# Patient Record
Sex: MALE | Race: BLACK OR AFRICAN AMERICAN | Employment: UNEMPLOYED | URBAN - METROPOLITAN AREA
[De-identification: names, ages, dates, MRNs, and addresses within clinical notes are randomized per-mention and may not be internally consistent; named-entity substitution may affect disease eponyms.]

---

## 2018-07-09 ENCOUNTER — TELEPHONE (OUTPATIENT)
Dept: SURGERY | Age: 58
End: 2018-07-09

## 2018-07-09 NOTE — TELEPHONE ENCOUNTER
I called [phone] at 1:00pm on 07/09/2018 and spoke with Mr. Diallo Scott about scheduling him an appointment with Dr. Kenyatta Morales for his inguinal hernia repair. Mr. Diallo Scott informed me that, Bety Pedraza would have to call me right back due to his sister is his transportation to and from all his appointments. \" I gave Mr. Strickland my direct number to call me back, so that we can set up his appointment as soon as possible. . Maria Luisa Gann received a call from [phone] at 1:12pm from Mrs. Israel Kolb, to schedule an appointment with Dr. Kenyatta Morales on July 25, 2018 at 08:30am. Mrs. Wallace seemed to be unsure if that date and time would work. I explained to Mrs. Wallace that if that date and time didn't work to please call me and we would be able to reschedule Mr. Leary appointment. ..  Perla Mcmahon

## 2020-01-03 ENCOUNTER — HOSPITAL ENCOUNTER (EMERGENCY)
Age: 60
Discharge: HOME | End: 2020-01-03
Attending: EMERGENCY MEDICINE
Payer: MEDICAID

## 2020-01-03 ENCOUNTER — APPOINTMENT (OUTPATIENT)
Dept: GENERAL RADIOLOGY | Age: 60
End: 2020-01-03
Attending: PHYSICIAN ASSISTANT
Payer: MEDICAID

## 2020-01-03 VITALS
HEIGHT: 69 IN | TEMPERATURE: 98.1 F | WEIGHT: 140 LBS | BODY MASS INDEX: 20.73 KG/M2 | RESPIRATION RATE: 18 BRPM | DIASTOLIC BLOOD PRESSURE: 78 MMHG | SYSTOLIC BLOOD PRESSURE: 139 MMHG | HEART RATE: 70 BPM | OXYGEN SATURATION: 98 %

## 2020-01-03 DIAGNOSIS — M25.512 ACUTE PAIN OF LEFT SHOULDER: Primary | ICD-10-CM
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
## 2020-01-03 PROCEDURE — 99283 EMERGENCY DEPT VISIT LOW MDM: CPT

## 2020-01-03 PROCEDURE — 74011250637 HC RX REV CODE- 250/637: Performed by: PHYSICIAN ASSISTANT

## 2020-01-03 PROCEDURE — 73030 X-RAY EXAM OF SHOULDER: CPT

## 2020-01-03 RX ORDER — IBUPROFEN 600 MG/1
600 TABLET ORAL
Status: COMPLETED | OUTPATIENT
Start: 2020-01-03 | End: 2020-01-03

## 2020-01-03 RX ORDER — CYCLOBENZAPRINE HCL 10 MG
10 TABLET ORAL
Qty: 12 TAB | Refills: 0 | Status: SHIPPED | OUTPATIENT
Start: 2020-01-03 | End: 2022-01-19

## 2020-01-03 RX ORDER — IBUPROFEN 600 MG/1
600 TABLET ORAL
Qty: 20 TAB | Refills: 0 | Status: SHIPPED | OUTPATIENT
Start: 2020-01-03 | End: 2020-01-03

## 2020-01-03 RX ORDER — CYCLOBENZAPRINE HCL 10 MG
10 TABLET ORAL
Status: COMPLETED | OUTPATIENT
Start: 2020-01-03 | End: 2020-01-03

## 2020-01-03 RX ADMIN — CYCLOBENZAPRINE HYDROCHLORIDE 10 MG: 10 TABLET, FILM COATED ORAL at 14:28

## 2020-01-03 RX ADMIN — IBUPROFEN 600 MG: 600 TABLET ORAL at 14:29
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
## 2021-07-17 ENCOUNTER — HOSPITAL ENCOUNTER (EMERGENCY)
Age: 61
Discharge: HOME | End: 2021-07-17
Attending: EMERGENCY MEDICINE
Payer: MEDICAID

## 2021-07-17 VITALS
OXYGEN SATURATION: 97 % | RESPIRATION RATE: 18 BRPM | HEART RATE: 92 BPM | TEMPERATURE: 98.8 F | SYSTOLIC BLOOD PRESSURE: 107 MMHG | DIASTOLIC BLOOD PRESSURE: 73 MMHG | HEIGHT: 67 IN | BODY MASS INDEX: 21.97 KG/M2 | WEIGHT: 140 LBS

## 2021-07-17 DIAGNOSIS — F10.10 ALCOHOL ABUSE: Primary | ICD-10-CM

## 2021-07-17 LAB
ALBUMIN SERPL-MCNC: 4.1 G/DL (ref 3.4–5)
ALBUMIN/GLOB SERPL: 1 {RATIO} (ref 0.8–1.7)
ALP SERPL-CCNC: 95 U/L (ref 45–117)
ALT SERPL-CCNC: 36 U/L (ref 16–61)
ANION GAP SERPL CALC-SCNC: 7 MMOL/L (ref 3–18)
AST SERPL-CCNC: 63 U/L (ref 10–38)
BASOPHILS # BLD: 0.1 K/UL (ref 0–0.1)
BASOPHILS NFR BLD: 1 % (ref 0–2)
BILIRUB SERPL-MCNC: 0.4 MG/DL (ref 0.2–1)
BUN SERPL-MCNC: 9 MG/DL (ref 7–18)
BUN/CREAT SERPL: 16 (ref 12–20)
CALCIUM SERPL-MCNC: 8.8 MG/DL (ref 8.5–10.1)
CHLORIDE SERPL-SCNC: 106 MMOL/L (ref 100–111)
CO2 SERPL-SCNC: 27 MMOL/L (ref 21–32)
CREAT SERPL-MCNC: 0.55 MG/DL (ref 0.6–1.3)
DIFFERENTIAL METHOD BLD: ABNORMAL
EOSINOPHIL # BLD: 0.1 K/UL (ref 0–0.4)
EOSINOPHIL NFR BLD: 2 % (ref 0–5)
ERYTHROCYTE [DISTWIDTH] IN BLOOD BY AUTOMATED COUNT: 12.4 % (ref 11.6–14.5)
ETHANOL SERPL-MCNC: 325 MG/DL (ref 0–3)
GLOBULIN SER CALC-MCNC: 4.1 G/DL (ref 2–4)
GLUCOSE SERPL-MCNC: 81 MG/DL (ref 74–99)
HCT VFR BLD AUTO: 36.4 % (ref 36–48)
HGB BLD-MCNC: 12 G/DL (ref 13–16)
LIPASE SERPL-CCNC: 58 U/L (ref 73–393)
LYMPHOCYTES # BLD: 2 K/UL (ref 0.9–3.6)
LYMPHOCYTES NFR BLD: 41 % (ref 21–52)
MAGNESIUM SERPL-MCNC: 2.1 MG/DL (ref 1.6–2.6)
MCH RBC QN AUTO: 30.9 PG (ref 24–34)
MCHC RBC AUTO-ENTMCNC: 33 G/DL (ref 31–37)
MCV RBC AUTO: 93.8 FL (ref 74–97)
MONOCYTES # BLD: 0.4 K/UL (ref 0.05–1.2)
MONOCYTES NFR BLD: 9 % (ref 3–10)
NEUTS SEG # BLD: 2.2 K/UL (ref 1.8–8)
NEUTS SEG NFR BLD: 47 % (ref 40–73)
PLATELET # BLD AUTO: 280 K/UL (ref 135–420)
PMV BLD AUTO: 10.5 FL (ref 9.2–11.8)
POTASSIUM SERPL-SCNC: 3.8 MMOL/L (ref 3.5–5.5)
PROT SERPL-MCNC: 8.2 G/DL (ref 6.4–8.2)
RBC # BLD AUTO: 3.88 M/UL (ref 4.35–5.65)
SODIUM SERPL-SCNC: 140 MMOL/L (ref 136–145)
WBC # BLD AUTO: 4.8 K/UL (ref 4.6–13.2)

## 2021-07-17 PROCEDURE — 99281 EMR DPT VST MAYX REQ PHY/QHP: CPT

## 2021-07-17 PROCEDURE — 83690 ASSAY OF LIPASE: CPT

## 2021-07-17 PROCEDURE — 80053 COMPREHEN METABOLIC PANEL: CPT

## 2021-07-17 PROCEDURE — 83735 ASSAY OF MAGNESIUM: CPT

## 2021-07-17 PROCEDURE — 82077 ASSAY SPEC XCP UR&BREATH IA: CPT

## 2021-07-17 PROCEDURE — 85025 COMPLETE CBC W/AUTO DIFF WBC: CPT

## 2021-07-17 NOTE — ED NOTES
Pt states he did not want to stay and demanded that his IV be removed. IV removed and pt and sister left.
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
## 2022-01-19 ENCOUNTER — HOSPITAL ENCOUNTER (INPATIENT)
Age: 62
LOS: 1 days | Discharge: HOME | DRG: 137 | End: 2022-01-20
Attending: STUDENT IN AN ORGANIZED HEALTH CARE EDUCATION/TRAINING PROGRAM | Admitting: HOSPITALIST
Payer: MEDICAID

## 2022-01-19 ENCOUNTER — APPOINTMENT (OUTPATIENT)
Dept: CT IMAGING | Age: 62
DRG: 137 | End: 2022-01-19
Attending: PHYSICIAN ASSISTANT
Payer: MEDICAID

## 2022-01-19 ENCOUNTER — APPOINTMENT (OUTPATIENT)
Dept: GENERAL RADIOLOGY | Age: 62
DRG: 137 | End: 2022-01-19
Attending: PHYSICIAN ASSISTANT
Payer: MEDICAID

## 2022-01-19 DIAGNOSIS — J12.82 PNEUMONIA DUE TO COVID-19 VIRUS: ICD-10-CM

## 2022-01-19 DIAGNOSIS — Z72.0 TOBACCO ABUSE: ICD-10-CM

## 2022-01-19 DIAGNOSIS — I26.99 ACUTE PULMONARY EMBOLISM WITHOUT ACUTE COR PULMONALE, UNSPECIFIED PULMONARY EMBOLISM TYPE (HCC): Primary | ICD-10-CM

## 2022-01-19 DIAGNOSIS — R07.81 PLEURITIC CHEST PAIN: ICD-10-CM

## 2022-01-19 DIAGNOSIS — Z86.16 HISTORY OF COVID-19: ICD-10-CM

## 2022-01-19 DIAGNOSIS — U07.1 COVID-19 VIRUS INFECTION: ICD-10-CM

## 2022-01-19 DIAGNOSIS — U07.1 PNEUMONIA DUE TO COVID-19 VIRUS: ICD-10-CM

## 2022-01-19 DIAGNOSIS — I26.99 OTHER ACUTE PULMONARY EMBOLISM, UNSPECIFIED WHETHER ACUTE COR PULMONALE PRESENT (HCC): ICD-10-CM

## 2022-01-19 LAB
ALBUMIN SERPL-MCNC: 3.1 G/DL (ref 3.4–5)
ALBUMIN/GLOB SERPL: 0.7 {RATIO} (ref 0.8–1.7)
ALP SERPL-CCNC: 69 U/L (ref 45–117)
ALT SERPL-CCNC: 13 U/L (ref 16–61)
ANION GAP SERPL CALC-SCNC: 6 MMOL/L (ref 3–18)
APTT PPP: 36 SEC (ref 23–36.4)
AST SERPL-CCNC: 15 U/L (ref 10–38)
B PERT DNA SPEC QL NAA+PROBE: NOT DETECTED
BASOPHILS # BLD: 0 K/UL (ref 0–0.1)
BASOPHILS NFR BLD: 0 % (ref 0–2)
BILIRUB SERPL-MCNC: 0.6 MG/DL (ref 0.2–1)
BNP SERPL-MCNC: 113 PG/ML (ref 0–900)
BORDETELLA PARAPERTUSSIS PCR, BORPAR: NOT DETECTED
BUN SERPL-MCNC: 7 MG/DL (ref 7–18)
BUN/CREAT SERPL: 12 (ref 12–20)
C PNEUM DNA SPEC QL NAA+PROBE: NOT DETECTED
CALCIUM SERPL-MCNC: 9.1 MG/DL (ref 8.5–10.1)
CHLORIDE SERPL-SCNC: 100 MMOL/L (ref 100–111)
CO2 SERPL-SCNC: 29 MMOL/L (ref 21–32)
CREAT SERPL-MCNC: 0.6 MG/DL (ref 0.6–1.3)
D DIMER PPP FEU-MCNC: 2.62 UG/ML(FEU)
DIFFERENTIAL METHOD BLD: ABNORMAL
EOSINOPHIL # BLD: 0 K/UL (ref 0–0.4)
EOSINOPHIL NFR BLD: 0 % (ref 0–5)
ERYTHROCYTE [DISTWIDTH] IN BLOOD BY AUTOMATED COUNT: 11.9 % (ref 11.6–14.5)
FLUAV H1 2009 PAND RNA SPEC QL NAA+PROBE: NOT DETECTED
FLUAV H1 RNA SPEC QL NAA+PROBE: NOT DETECTED
FLUAV H3 RNA SPEC QL NAA+PROBE: NOT DETECTED
FLUAV SUBTYP SPEC NAA+PROBE: NOT DETECTED
FLUBV RNA SPEC QL NAA+PROBE: NOT DETECTED
GLOBULIN SER CALC-MCNC: 4.2 G/DL (ref 2–4)
GLUCOSE SERPL-MCNC: 111 MG/DL (ref 74–99)
HADV DNA SPEC QL NAA+PROBE: NOT DETECTED
HCOV 229E RNA SPEC QL NAA+PROBE: NOT DETECTED
HCOV HKU1 RNA SPEC QL NAA+PROBE: NOT DETECTED
HCOV NL63 RNA SPEC QL NAA+PROBE: NOT DETECTED
HCOV OC43 RNA SPEC QL NAA+PROBE: NOT DETECTED
HCT VFR BLD AUTO: 35.3 % (ref 36–48)
HGB BLD-MCNC: 11.7 G/DL (ref 13–16)
HMPV RNA SPEC QL NAA+PROBE: NOT DETECTED
HPIV1 RNA SPEC QL NAA+PROBE: NOT DETECTED
HPIV2 RNA SPEC QL NAA+PROBE: NOT DETECTED
HPIV3 RNA SPEC QL NAA+PROBE: NOT DETECTED
HPIV4 RNA SPEC QL NAA+PROBE: NOT DETECTED
IMM GRANULOCYTES # BLD AUTO: 0.1 K/UL (ref 0–0.04)
IMM GRANULOCYTES NFR BLD AUTO: 1 % (ref 0–0.5)
LYMPHOCYTES # BLD: 1.4 K/UL (ref 0.9–3.6)
LYMPHOCYTES NFR BLD: 14 % (ref 21–52)
M PNEUMO DNA SPEC QL NAA+PROBE: NOT DETECTED
MCH RBC QN AUTO: 31 PG (ref 24–34)
MCHC RBC AUTO-ENTMCNC: 33.1 G/DL (ref 31–37)
MCV RBC AUTO: 93.6 FL (ref 78–100)
MONOCYTES # BLD: 1.2 K/UL (ref 0.05–1.2)
MONOCYTES NFR BLD: 13 % (ref 3–10)
NEUTS SEG # BLD: 6.8 K/UL (ref 1.8–8)
NEUTS SEG NFR BLD: 72 % (ref 40–73)
NRBC # BLD: 0 K/UL (ref 0–0.01)
NRBC BLD-RTO: 0 PER 100 WBC
PLATELET # BLD AUTO: 424 K/UL (ref 135–420)
PMV BLD AUTO: 9.8 FL (ref 9.2–11.8)
POTASSIUM SERPL-SCNC: 3.9 MMOL/L (ref 3.5–5.5)
PROT SERPL-MCNC: 7.3 G/DL (ref 6.4–8.2)
RBC # BLD AUTO: 3.77 M/UL (ref 4.35–5.65)
RSV RNA SPEC QL NAA+PROBE: NOT DETECTED
RV+EV RNA SPEC QL NAA+PROBE: NOT DETECTED
SARS-COV-2 PCR, COVPCR: NOT DETECTED
SODIUM SERPL-SCNC: 135 MMOL/L (ref 136–145)
TROPONIN-HIGH SENSITIVITY: 4 NG/L (ref 0–78)
TROPONIN-HIGH SENSITIVITY: 5 NG/L (ref 0–78)
TROPONIN-HIGH SENSITIVITY: 5 NG/L (ref 0–78)
WBC # BLD AUTO: 9.6 K/UL (ref 4.6–13.2)

## 2022-01-19 PROCEDURE — 94761 N-INVAS EAR/PLS OXIMETRY MLT: CPT

## 2022-01-19 PROCEDURE — 80053 COMPREHEN METABOLIC PANEL: CPT

## 2022-01-19 PROCEDURE — 74011000250 HC RX REV CODE- 250: Performed by: PHYSICIAN ASSISTANT

## 2022-01-19 PROCEDURE — 74011250637 HC RX REV CODE- 250/637: Performed by: PHYSICIAN ASSISTANT

## 2022-01-19 PROCEDURE — 99284 EMERGENCY DEPT VISIT MOD MDM: CPT

## 2022-01-19 PROCEDURE — 65270000029 HC RM PRIVATE

## 2022-01-19 PROCEDURE — 85730 THROMBOPLASTIN TIME PARTIAL: CPT

## 2022-01-19 PROCEDURE — 85379 FIBRIN DEGRADATION QUANT: CPT

## 2022-01-19 PROCEDURE — 93005 ELECTROCARDIOGRAM TRACING: CPT

## 2022-01-19 PROCEDURE — 85025 COMPLETE CBC W/AUTO DIFF WBC: CPT

## 2022-01-19 PROCEDURE — 0202U NFCT DS 22 TRGT SARS-COV-2: CPT

## 2022-01-19 PROCEDURE — APPSS45 APP SPLIT SHARED TIME 31-45 MINUTES: Performed by: PHYSICIAN ASSISTANT

## 2022-01-19 PROCEDURE — 99223 1ST HOSP IP/OBS HIGH 75: CPT | Performed by: STUDENT IN AN ORGANIZED HEALTH CARE EDUCATION/TRAINING PROGRAM

## 2022-01-19 PROCEDURE — APPNB45 APP NON BILLABLE 31-45 MINUTES: Performed by: REGISTERED NURSE

## 2022-01-19 PROCEDURE — 74011000250 HC RX REV CODE- 250: Performed by: INTERNAL MEDICINE

## 2022-01-19 PROCEDURE — 99223 1ST HOSP IP/OBS HIGH 75: CPT | Performed by: INTERNAL MEDICINE

## 2022-01-19 PROCEDURE — G0378 HOSPITAL OBSERVATION PER HR: HCPCS

## 2022-01-19 PROCEDURE — 71045 X-RAY EXAM CHEST 1 VIEW: CPT

## 2022-01-19 PROCEDURE — 83880 ASSAY OF NATRIURETIC PEPTIDE: CPT

## 2022-01-19 PROCEDURE — 74011250636 HC RX REV CODE- 250/636: Performed by: PHYSICIAN ASSISTANT

## 2022-01-19 PROCEDURE — 74011000636 HC RX REV CODE- 636: Performed by: STUDENT IN AN ORGANIZED HEALTH CARE EDUCATION/TRAINING PROGRAM

## 2022-01-19 PROCEDURE — 71275 CT ANGIOGRAPHY CHEST: CPT

## 2022-01-19 PROCEDURE — 84484 ASSAY OF TROPONIN QUANT: CPT

## 2022-01-19 RX ORDER — HYDROCODONE POLISTIREX AND CHLORPHENIRAMINE POLISTIREX 10; 8 MG/5ML; MG/5ML
5 SUSPENSION, EXTENDED RELEASE ORAL 2 TIMES DAILY
COMMUNITY
Start: 2022-01-10

## 2022-01-19 RX ORDER — SODIUM CHLORIDE 0.9 % (FLUSH) 0.9 %
5-40 SYRINGE (ML) INJECTION AS NEEDED
Status: DISCONTINUED | OUTPATIENT
Start: 2022-01-19 | End: 2022-01-20 | Stop reason: HOSPADM

## 2022-01-19 RX ORDER — LIDOCAINE 4 G/100G
3 PATCH TOPICAL EVERY 24 HOURS
Status: DISCONTINUED | OUTPATIENT
Start: 2022-01-19 | End: 2022-01-20 | Stop reason: HOSPADM

## 2022-01-19 RX ORDER — KETOROLAC TROMETHAMINE 15 MG/ML
15 INJECTION, SOLUTION INTRAMUSCULAR; INTRAVENOUS
Status: DISCONTINUED | OUTPATIENT
Start: 2022-01-19 | End: 2022-01-20

## 2022-01-19 RX ORDER — SODIUM CHLORIDE 0.9 % (FLUSH) 0.9 %
5-40 SYRINGE (ML) INJECTION EVERY 8 HOURS
Status: DISCONTINUED | OUTPATIENT
Start: 2022-01-19 | End: 2022-01-20 | Stop reason: HOSPADM

## 2022-01-19 RX ORDER — ACETAMINOPHEN 325 MG/1
650 TABLET ORAL
Status: DISCONTINUED | OUTPATIENT
Start: 2022-01-19 | End: 2022-01-19 | Stop reason: SDUPTHER

## 2022-01-19 RX ORDER — HEPARIN SODIUM 1000 [USP'U]/ML
80 INJECTION, SOLUTION INTRAVENOUS; SUBCUTANEOUS ONCE
Status: COMPLETED | OUTPATIENT
Start: 2022-01-19 | End: 2022-01-19

## 2022-01-19 RX ORDER — BENZONATATE 100 MG/1
100 CAPSULE ORAL
Status: DISCONTINUED | OUTPATIENT
Start: 2022-01-19 | End: 2022-01-20 | Stop reason: HOSPADM

## 2022-01-19 RX ORDER — ACETAMINOPHEN 325 MG/1
650 TABLET ORAL
Status: DISCONTINUED | OUTPATIENT
Start: 2022-01-19 | End: 2022-01-20 | Stop reason: HOSPADM

## 2022-01-19 RX ADMIN — HEPARIN SODIUM 18 UNITS/KG/HR: 1000 INJECTION INTRAVENOUS; SUBCUTANEOUS at 15:14

## 2022-01-19 RX ADMIN — IOPAMIDOL 80 ML: 755 INJECTION, SOLUTION INTRAVENOUS at 13:18

## 2022-01-19 RX ADMIN — HEPARIN SODIUM 4540 UNITS: 1000 INJECTION, SOLUTION INTRAVENOUS; SUBCUTANEOUS at 15:14

## 2022-01-19 RX ADMIN — SODIUM CHLORIDE, PRESERVATIVE FREE 10 ML: 5 INJECTION INTRAVENOUS at 15:26

## 2022-01-19 RX ADMIN — SODIUM CHLORIDE, PRESERVATIVE FREE 10 ML: 5 INJECTION INTRAVENOUS at 21:29

## 2022-01-19 RX ADMIN — RIVAROXABAN 15 MG: 15 TABLET, FILM COATED ORAL at 19:02

## 2022-01-19 RX ADMIN — KETOROLAC TROMETHAMINE 15 MG: 15 INJECTION, SOLUTION INTRAMUSCULAR; INTRAVENOUS at 17:55

## 2022-01-19 NOTE — CONSULTS
[facility]  Pulmonary, Critical Care, and Sleep Medicine      Name: Ludy Guadarrama MRN: [id number]   : 1960 Hospital[facility]   Date: 2022          Pulmonary Initial Patient Consultation    Requesting MD: JACKIE Aburto  Reason for CC:  PE    IMPRESSION:   · Acute PE- hemodynamically stable on room air. CT (): Right lower lobe intersegmental artery PE extending to posterior and lateral basal segmental PE. PESI score:92, intermediate risk  · COVID + with multifocal pneumonia- tested positive last week with PCP, unvaccinated  · Pleurtic chest pain  · Tobacco abuse- 1/2 pk smoker for about 30 years  · ETOH abuse- last drink on , typically drinks beer        PLAN:   Neuro: No acute needs, neuro checks per routine  Pulm: Supplemental O2 via NC, titrate flow for goal SPO2> 90%  Aspiration precautions, Keep HOB >30 degrees. COVID appears stable, no consolidation in CXR. Heparin ggt per PE protocol, low risk for bleed  CVS Monitor HD, currently stable, Check cardiac panel, ECHO  to check for RV strain. Troponin and BNP negative, will trend troponin x 3  GI: SUP, Trend LFTs, Zofran PRN for N/V, Diet/NPO  Renal:  Trend Renal indices, Strict Is/Os,  Hem/Onc: Daily CBC. Monitor for s/o active bleeding. I/D:Trend WBCs and temperature curve. No needs for antibiotics  Metabolic:  Daily BMP, mag, phos. Trend lytes, replace as needed. Musc/Skin: no acute issues    Full Code  Discussed w/ attending physician   This note has been prepared for physician consultation. Subjective/History: This patient has been seen and evaluated at the request of Dr. Mago Wood for  Acute PE    Patient is a 57 y.o. male with no reported PMHx aside from being diagnosed COVID positive by his PCP last week; brought by EMS due to shortness of breath that has been ongoing for 1 week, productive cough and pain on his right lower ribs.  Pt is a long term smoker for about 30 years, and admits to drinking beer over the weekend. Pt only takes OTC medications for his cough. Chest CT showed Right lower lobe intersegmental artery PE extending to posterior and lateral basal segmental PE. On exam, pt is on room air and remains hemodynamically stable. Past Medical History:   Diagnosis Date    COVID-19 virus infection 1/10/2022    Tobacco abuse 2022      No past surgical history on file. Prior to Admission medications    Medication Sig Start Date End Date Taking? Authorizing Provider   cyclobenzaprine (FLEXERIL) 10 mg tablet Take 1 Tab by mouth three (3) times daily as needed for Muscle Spasm(s). 1/3/20   Margareth Jewell, JONNY     Current Facility-Administered Medications   Medication Dose Route Frequency    heparin 25,000 units in  ml infusion  18-36 Units/kg/hr IntraVENous TITRATE    sodium chloride (NS) flush 5-40 mL  5-40 mL IntraVENous Q8H     No Known Allergies   Social History     Tobacco Use    Smoking status: Current Every Day Smoker    Smokeless tobacco: Not on file   Substance Use Topics    Alcohol use: Yes      No family history on file. Review of Systems:  Pertinent items are noted in HPI. Objective:   Vital Signs:    Visit Vitals  /65 (BP 1 Location: Left upper arm, BP Patient Position: At rest)   Pulse (!) 103   Temp 98.3 °F (36.8 °C)   Resp (!) 32   Ht 5' 9\" (1.753 m)   Wt 56.7 kg (125 lb)   SpO2 97%   BMI 18.46 kg/m²               Temp (24hrs), Av.3 °F (36.8 °C), Min:98.3 °F (36.8 °C), Max:98.3 °F (36.8 °C)       Intake/Output:   Last shift:      No intake/output data recorded. Last 3 shifts: No intake/output data recorded.   No intake or output data in the 24 hours ending 22 1602    Ventilator Settings:  Mode Rate Tidal Volume Pressure FiO2 PEEP                    Peak airway pressure:      Minute ventilation:        ARDS network Guidelines: Lung protective strategy and Pl pressure goals____________      Physical Exam:    General:  Alert, cooperative, no distress, appears stated age. Head:  Normocephalic, without obvious abnormality, atraumatic. Eyes:  Conjunctivae/corneas clear. PERRL, EOMs intact. Nose: Nares normal. Septum midline. Mucosa normal. No drainage or sinus tenderness. Throat: Lips, mucosa, and tongue normal. Teeth and gums normal.   Neck: Supple, symmetrical, trachea midline, no adenopathy, thyroid: no enlargment/tenderness/nodules, no carotid bruit and no JVD. Back:   Symmetric, no curvature. ROM normal.   Lungs:   Clear to auscultation bilaterally. Chest wall:  No tenderness or deformity. Heart:  Regular rate and rhythm, S1, S2 normal, no murmur, click, rub or gallop. Abdomen:   Soft, non-tender. Bowel sounds normal. No masses,  No organomegaly. Extremities: Extremities normal, atraumatic, no cyanosis or edema. +/- restraints   Pulses: 2+ and symmetric all extremities. Skin: Skin color, texture, turgor normal. No rashes or lesions. Normal cap refill.     Lymph nodes: Cervical, supraclavicular, and axillary nodes normal.   Neurologic: Grossly nonfocal, AAO x 4       Data:     Recent Results (from the past 24 hour(s))   EKG, 12 LEAD, INITIAL    Collection Time: 01/19/22 10:14 AM   Result Value Ref Range    Ventricular Rate 115 BPM    Atrial Rate 115 BPM    P-R Interval 142 ms    QRS Duration 66 ms    Q-T Interval 322 ms    QTC Calculation (Bezet) 445 ms    Calculated P Axis 74 degrees    Calculated R Axis 62 degrees    Calculated T Axis 71 degrees    Diagnosis       Poor data quality, interpretation may be adversely affected  Sinus tachycardia  Otherwise normal ECG  No previous ECGs available     CBC WITH AUTOMATED DIFF    Collection Time: 01/19/22 10:46 AM   Result Value Ref Range    WBC 9.6 4.6 - 13.2 K/uL    RBC 3.77 (L) 4.35 - 5.65 M/uL    HGB 11.7 (L) 13.0 - 16.0 g/dL    HCT 35.3 (L) 36.0 - 48.0 %    MCV 93.6 78.0 - 100.0 FL    MCH 31.0 24.0 - 34.0 PG    MCHC 33.1 31.0 - 37.0 g/dL    RDW 11.9 11.6 - 14.5 % PLATELET 801 (H) 398 - 420 K/uL    MPV 9.8 9.2 - 11.8 FL    NRBC 0.0 0  WBC    ABSOLUTE NRBC 0.00 0.00 - 0.01 K/uL    NEUTROPHILS 72 40 - 73 %    LYMPHOCYTES 14 (L) 21 - 52 %    MONOCYTES 13 (H) 3 - 10 %    EOSINOPHILS 0 0 - 5 %    BASOPHILS 0 0 - 2 %    IMMATURE GRANULOCYTES 1 (H) 0.0 - 0.5 %    ABS. NEUTROPHILS 6.8 1.8 - 8.0 K/UL    ABS. LYMPHOCYTES 1.4 0.9 - 3.6 K/UL    ABS. MONOCYTES 1.2 0.05 - 1.2 K/UL    ABS. EOSINOPHILS 0.0 0.0 - 0.4 K/UL    ABS. BASOPHILS 0.0 0.0 - 0.1 K/UL    ABS. IMM. GRANS. 0.1 (H) 0.00 - 0.04 K/UL    DF AUTOMATED     METABOLIC PANEL, COMPREHENSIVE    Collection Time: 01/19/22 10:46 AM   Result Value Ref Range    Sodium 135 (L) 136 - 145 mmol/L    Potassium 3.9 3.5 - 5.5 mmol/L    Chloride 100 100 - 111 mmol/L    CO2 29 21 - 32 mmol/L    Anion gap 6 3.0 - 18 mmol/L    Glucose 111 (H) 74 - 99 mg/dL    BUN 7 7.0 - 18 MG/DL    Creatinine 0.60 0.6 - 1.3 MG/DL    BUN/Creatinine ratio 12 12 - 20      GFR est AA >60 >60 ml/min/1.73m2    GFR est non-AA >60 >60 ml/min/1.73m2    Calcium 9.1 8.5 - 10.1 MG/DL    Bilirubin, total 0.6 0.2 - 1.0 MG/DL    ALT (SGPT) 13 (L) 16 - 61 U/L    AST (SGOT) 15 10 - 38 U/L    Alk.  phosphatase 69 45 - 117 U/L    Protein, total 7.3 6.4 - 8.2 g/dL    Albumin 3.1 (L) 3.4 - 5.0 g/dL    Globulin 4.2 (H) 2.0 - 4.0 g/dL    A-G Ratio 0.7 (L) 0.8 - 1.7     TROPONIN-HIGH SENSITIVITY    Collection Time: 01/19/22 10:46 AM   Result Value Ref Range    Troponin-High Sensitivity 4 0 - 78 ng/L   D DIMER    Collection Time: 01/19/22 10:46 AM   Result Value Ref Range    D DIMER 2.62 (H) <0.46 ug/ml(FEU)   NT-PRO BNP    Collection Time: 01/19/22 10:46 AM   Result Value Ref Range    NT pro- 0 - 900 PG/ML   PTT    Collection Time: 01/19/22 10:46 AM   Result Value Ref Range    aPTT 36.0 23.0 - 36.4 SEC             Telemetry:normal sinus rhythm    Imaging:  I have personally reviewed the patients radiographs and have reviewed the reports:  CXR Results  (Last 48 hours) 01/19/22 1040  XR CHEST PORT Final result    Impression:   IMPRESSION:       1. Sequela of moderate congestion. Follow-up with plain imaging. Narrative:  HISTORY: Chest pain. EXAM: Chest.       TECHNIQUE: Two views of the chest were obtained. COMPARISON: 5/16/2016       FINDINGS: Moderate bilateral diffuse interstitial prominence with associated   minimal perihilar and bibasilar opacities. No pneumothorax or pleural effusions. Heart and mediastinal structures are unremarkable. . Visualized bony thorax and   soft tissues are within normal limits. Jyoti Garcia NP  01/19/22  Pulmonary, Critical Care Medicine  [facility]         Attending Note:  I saw and evaluated the patient, performing all elements of service personally. I discussed the findings, assessment and plan with the NP and agree with the NP's findings and plan as documented in the NP's note above. All edits and changes made above or are mentioned in my attending note which was independently assessed as well as written. I performed the substantive portion of this split shared, greater than 50% of time. 78-year-old male presented to [facility] in [place] with acute onset of shortness of breath and right-sided chest pain. Patient reports developing COVID-19 approximately a week prior, was around family members who had COVID. Patient test positive about a week ago. Cough and shortness of breath. Patient denies any recent traveling or prolonged immobilization. On exam, patient has significant pleuritic chest pain on right, pain out of proportion to exam, also some scattered rhonchi on that side otherwise benign  Assessment and plan:  · Acute pulmonary embolism, provoked: Secondary to COVID-19 infection. Patient has no recent history of prolonged immobilization or recent surgery. Patient reports having COVID over a week ago.   Imaging is associated with groundglass which is likely secondary to ischemia versus hemorrhage. No hemoptysis  · COVID-19 pneumonia: CT scan shows multifocal opacities. Patient unvaccinated  · Pleuritic chest pain: Due to above  · Tobacco dependence: Patient is a 1 pack/day smoker  · Alcohol dependence  Recs:  Pain control per primary service. I added Lidoderm patches, will defer other medications to primary service. Consider Toradol IV  Obtain TTE to assess PA pressures  Obtain B/L LE duplex prior to discharge  Continue full-dose anticoagulation. When cleared by primary service and other consultants, advise transitioning to other agent such as wt-based lovenox (while hospitalized) and then switching to either NOAC preferably Xarelto or Eliquis (depending on pt's insurance formulary coverage), or bridging to warfarin for discharge. Please discuss risks and benefits of anticoagulation because pt is at increased risk for bleeding of about 3.2% in the first 3 months and 1.6% thereafter, based on his history of alcohol abuse. I do believe benefits outweigh risks given their presentation. Based on ACCP guidelines, pt will need 3-6 months of anticoagulation. The decision to proceed with longer term/lifelong anticoagulation should be made at that time  Counseled pt regarding lifestyle precautions while on anticoagulation that would result in closed-head injury or life-threatening bleed  PT/OT/OOB to chair  Frequent incentive spirometry -- continue on discharge  Supplemental oxygen if needed. Please assess for home oxygen prior to discharge  F/U in pulmonary clinic in 4-6 months      N.B.  [address] [address] issued on 1/10/22 declared a limited state of emergency for hospitals and healthcare workers due to the COVID-19 pandemic Omicron surge. Administration for [employer]net[employer] has enacted Crisis Standards of Care throughout its entire healthcare system including [facility].       Leobardo Tian MD/MPH     Pulmonary, Critical Care [address] [place] [address] [facility]

## 2022-01-19 NOTE — H&P
History and Physical          Subjective     HPI: Ludy Guadarrama is a 57 y.o. male with a PMHx of tobacco abuse and COVID-19 infection (dx 01/10/22) who presented to the ED with c/o pleuritic chest pain x2 days. He was diagnosed with COVID on 1/10 by his PCP. He states the SOB, cough, and headache are still present. He denies chest pain at rest, stating it occurs with deep breaths. He does not take any medications at home except the tussionex he was prescribed last week. In the ED, he is tachycardic and tachypnic. Stable on room air. CTA chest with occlusive right lower lobe intersegmental artery level PE extending into the posterior and lateral basal segmental arteries resulting in a fairly large area of ischemic changes; Subtle peripheral nodular tiny groundglass densities or nodular opacities in both lungs, and small right pleural effusion. No RV strain noted on CT. Pulmonology has been consulted. Patient will be admitted for observation overnight. PMHx:  Past Medical History:   Diagnosis Date    COVID-19 virus infection 1/10/2022    Tobacco abuse 1/19/2022       PSurgHx:  No past surgical history on file. SocialHx:  Social History     Socioeconomic History    Marital status: UNKNOWN     Spouse name: Not on file    Number of children: Not on file    Years of education: Not on file    Highest education level: Not on file   Occupational History    Not on file   Tobacco Use    Smoking status: Current Every Day Smoker    Smokeless tobacco: Not on file   Substance and Sexual Activity    Alcohol use:  Yes    Drug use: No    Sexual activity: Not on file   Other Topics Concern    Not on file   Social History Narrative    Not on file     Social Determinants of Health     Financial Resource Strain:     Difficulty of Paying Living Expenses: Not on file   Food Insecurity:     Worried About Running Out of Food in the Last Year: Not on file    Marleni of Food in the Last Year: Not on file Transportation Needs:     Lack of Transportation (Medical): Not on file    Lack of Transportation (Non-Medical): Not on file   Physical Activity:     Days of Exercise per Week: Not on file    Minutes of Exercise per Session: Not on file   Stress:     Feeling of Stress : Not on file   Social Connections:     Frequency of Communication with Friends and Family: Not on file    Frequency of Social Gatherings with Friends and Family: Not on file    Attends Gnosticism Services: Not on file    Active Member of [address] [place] [address] or Organizations: Not on file    Attends Club or Organization Meetings: Not on file    Marital Status: Not on file   Intimate Partner Violence:     Fear of Current or Ex-Partner: Not on file    Emotionally Abused: Not on file    Physically Abused: Not on file    Sexually Abused: Not on file   Housing Stability:     Unable to Pay for Housing in the Last Year: Not on file    Number of [place] in the Last Year: Not on file    Unstable Housing in the Last Year: Not on file       FamilyHx:  No family history on file. Home Medications:  Prior to Admission Medications   Prescriptions Last Dose Informant Taking? HYDROcodone-chlorpheniramine (TUSSIONEX) 10-8 mg/5 mL suspension   Yes   Sig: Take 5 mL by mouth two (2) times a day. Facility-Administered Medications: None         Allergies:  No Known Allergies     Review of Systems:  CONST: no weight loss, no falls, no fever or chills  HEENT: No change in vision, no earache, no tinnitus, no sore throat or sinus congestion. NECK: No pain or stiffness. PULM: +shortness of breath, +cough or wheeze. CV: no pnd or orthopnea, +CP, no palpitations, no edema  GI: No abdominal pain, no nausea, no vomiting or diarrhea, no melena or bright red blood per rectum. : No urinary frequency, no urgency, no hesitancy or dysuria. MSK: No joint or muscle pain, no back pain, no recent trauma. INTEG: No rash, no itching, no lesions.    ENDO No polyuria, no polydipsia, no heat or cold intolerance. HEME: No anemia or easy bruising or bleeding. NEURO No headache, no dizziness, no seizures, no numbness, no tingling or weakness.    PSYCH: No anxiety, no depression      Objective     Physical Exam:  Visit Vitals  /65 (BP 1 Location: Left upper arm, BP Patient Position: At rest)   Pulse (!) 103   Temp 98.3 °F (36.8 °C)   Resp (!) 32   Ht 5' 9\" (1.753 m)   Wt 56.7 kg (125 lb)   SpO2 97%   BMI 18.46 kg/m²       General: NAD, appears stated age, alert  Skin: warm, dry, no rashes  Eyes: PERRL, sclera is non-icteric  HENT: normocephalic/atraumatic, moist mucus membranes  Respiratory: CTA with no signs of respiratory distress  Cardiovascular: tachycardic, no m/r/g  GI: soft, non-tender, normal bowel sounds  Extremities: no cyanosis, no peripheral edema  Neuro: moves all extremities, no focal deficits, normal speech  Psych: appropriate mood and affect    Laboratory Studies:  Recent Results (from the past 24 hour(s))   EKG, 12 LEAD, INITIAL    Collection Time: 01/19/22 10:14 AM   Result Value Ref Range    Ventricular Rate 115 BPM    Atrial Rate 115 BPM    P-R Interval 142 ms    QRS Duration 66 ms    Q-T Interval 322 ms    QTC Calculation (Bezet) 445 ms    Calculated P Axis 74 degrees    Calculated R Axis 62 degrees    Calculated T Axis 71 degrees    Diagnosis       Poor data quality, interpretation may be adversely affected  Sinus tachycardia  Otherwise normal ECG  No previous ECGs available     CBC WITH AUTOMATED DIFF    Collection Time: 01/19/22 10:46 AM   Result Value Ref Range    WBC 9.6 4.6 - 13.2 K/uL    RBC 3.77 (L) 4.35 - 5.65 M/uL    HGB 11.7 (L) 13.0 - 16.0 g/dL    HCT 35.3 (L) 36.0 - 48.0 %    MCV 93.6 78.0 - 100.0 FL    MCH 31.0 24.0 - 34.0 PG    MCHC 33.1 31.0 - 37.0 g/dL    RDW 11.9 11.6 - 14.5 %    PLATELET 763 (H) 052 - 420 K/uL    MPV 9.8 9.2 - 11.8 FL    NRBC 0.0 0  WBC    ABSOLUTE NRBC 0.00 0.00 - 0.01 K/uL    NEUTROPHILS 72 40 - 73 % LYMPHOCYTES 14 (L) 21 - 52 %    MONOCYTES 13 (H) 3 - 10 %    EOSINOPHILS 0 0 - 5 %    BASOPHILS 0 0 - 2 %    IMMATURE GRANULOCYTES 1 (H) 0.0 - 0.5 %    ABS. NEUTROPHILS 6.8 1.8 - 8.0 K/UL    ABS. LYMPHOCYTES 1.4 0.9 - 3.6 K/UL    ABS. MONOCYTES 1.2 0.05 - 1.2 K/UL    ABS. EOSINOPHILS 0.0 0.0 - 0.4 K/UL    ABS. BASOPHILS 0.0 0.0 - 0.1 K/UL    ABS. IMM. GRANS. 0.1 (H) 0.00 - 0.04 K/UL    DF AUTOMATED     METABOLIC PANEL, COMPREHENSIVE    Collection Time: 01/19/22 10:46 AM   Result Value Ref Range    Sodium 135 (L) 136 - 145 mmol/L    Potassium 3.9 3.5 - 5.5 mmol/L    Chloride 100 100 - 111 mmol/L    CO2 29 21 - 32 mmol/L    Anion gap 6 3.0 - 18 mmol/L    Glucose 111 (H) 74 - 99 mg/dL    BUN 7 7.0 - 18 MG/DL    Creatinine 0.60 0.6 - 1.3 MG/DL    BUN/Creatinine ratio 12 12 - 20      GFR est AA >60 >60 ml/min/1.73m2    GFR est non-AA >60 >60 ml/min/1.73m2    Calcium 9.1 8.5 - 10.1 MG/DL    Bilirubin, total 0.6 0.2 - 1.0 MG/DL    ALT (SGPT) 13 (L) 16 - 61 U/L    AST (SGOT) 15 10 - 38 U/L    Alk. phosphatase 69 45 - 117 U/L    Protein, total 7.3 6.4 - 8.2 g/dL    Albumin 3.1 (L) 3.4 - 5.0 g/dL    Globulin 4.2 (H) 2.0 - 4.0 g/dL    A-G Ratio 0.7 (L) 0.8 - 1.7     TROPONIN-HIGH SENSITIVITY    Collection Time: 01/19/22 10:46 AM   Result Value Ref Range    Troponin-High Sensitivity 4 0 - 78 ng/L   D DIMER    Collection Time: 01/19/22 10:46 AM   Result Value Ref Range    D DIMER 2.62 (H) <0.46 ug/ml(FEU)   NT-PRO BNP    Collection Time: 01/19/22 10:46 AM   Result Value Ref Range    NT pro- 0 - 900 PG/ML       Imaging Reviewed:  CTA CHEST W OR W WO CONT    Result Date: 1/19/2022  EXAM:  CTA Chest with Contrast (Study for PE). CLINICAL INDICATION:  Shortness of breath and chest pain. COVID-19 infection. COMPARISON:  None. TECHNIQUE:  Helical volumetric sections of the chest are obtained with CT pulmonary angiogram protocol following 80 mL Isovue-370.   Subsequently, sagittal and coronal multiplanar reconstruction images are obtained. Maximum intensity projection images are generated to better delineate the pulmonary vasculature, differentiate between the pulmonary arteries and veins and to increase sensitivity to pulmonary emboli. Radiation dose optimization techniques are utilized as appropriate to the exam, with combination of automated exposure control, adjustment of the mA and/or kV according to patient's size (Including appropriate matching for site-specific examinations), or use of iterative reconstruction technique. FINDINGS: Pulmonary Arteries:  - Occlusive right lower lobe intersegmental artery PE between the anterior basal segmental artery and the posterior segmental artery extending into the lateral and posterior basal segmental arteries, associated with developing focal area of groundglass opacities encompassing an area of about 9.0 x 6.0 cm. This is in turn associated with minimal right pleural effusion. - No significant reflux of contrast into the IVC. No interventricular septal deviation or abnormal increase in the RV/LV ratio. Mild pulmonary artery dilation, measuring up to about 3.2 cm. Lung, Pleura, Airways: - Right lower lobe consolidative groundglass opacity as described, presumably related to the ovary parenchymal ischemia from the occlusive PE. - Mild right pleural effusion. - Multiple patchy peripheral nodular groundglass opacities. - Left lower lobe atelectatic changes. Mediastinum:  No mediastinal adenopathy. Aorta:  No evidence of aortic dissection or aneurysm. Base of Neck:  No acute findings. Axillae:  Unremarkable. Upper Abdomen: The visualized contents of the abdomen are within normal limits. Skeletal Structures:  No acute findings.               1.  Occlusive right lower lobe intersegmental artery level PE extending into the posterior and lateral basal segmental arteries resulting in a fairly large area of ischemic changes as evidenced by developing ground glass opacities. 2.  Subtle peripheral nodular tiny groundglass densities or nodular opacities in both lungs. Overall pattern is nonspecific, and may occur with a variety of infectious or noninfectious processes. Classification indeterminant. 3.  Small right pleural effusion. Called E.D. at 14:30 and provided above findings to Dr. Bc Fontaine. XR CHEST PORT    Result Date: 1/19/2022  HISTORY: Chest pain. EXAM: Chest. TECHNIQUE: Two views of the chest were obtained. COMPARISON: 5/16/2016 FINDINGS: Moderate bilateral diffuse interstitial prominence with associated minimal perihilar and bibasilar opacities. No pneumothorax or pleural effusions. Heart and mediastinal structures are unremarkable. . Visualized bony thorax and soft tissues are within normal limits. IMPRESSION: 1. Sequela of moderate congestion. Follow-up with plain imaging. Assessment/Plan     Principal Problem:    Acute pulmonary embolism ([address] (1/19/2022)    Active Problems:    COVID-19 virus infection (1/10/2022)      Tobacco abuse (1/19/2022)      PE  - associated with COVID infection  - pulmonology consulted - appreciate assistance  - monitor for O2 requirements. Currently stable on room air  - no RV strain on CTA, check echo  - switch to Xarelto  - cardiac monitoring  - incentive spirometry  - PRN pain control. Tylenol and toradol for now    COVID-19 infection  - diagnosed 1/10/22   - droplet plus     Tobacco abuse  - counseled cessation    DVT Prophylaxis:  []Lovenox  []Hep SQ  []SCDs  []Coumadin   []On Heparin gtt [x]PO anticoagulant    I have personally reviewed all pertinent labs, films and EKGs that have officially resulted. I reviewed available electronic documentation outlining the initial presentation as well as the emergency room physician's encounter.     JONNY [facility]  Hospitalist Division  Office:  [phone]  Pager: [phone]

## 2022-01-19 NOTE — ED PROVIDER NOTES
EMERGENCY DEPARTMENT HISTORY AND PHYSICAL EXAM      Date: 1/19/2022  Patient Name: Sanjeev Shah    History of Presenting Illness     Chief Complaint   Patient presents with    Cough       History Provided By: Patient    HPI: Sanjeev Shah, 57 y.o. male no significant PMHX presents via ambulance to the ED. Pt reports productive cough and intermittent SOB, worsening over the past week. Pt reports pain to right ribs. Patient reports subjective fever and chills. Patient tested positive for COVID 1 week ago. Did not received COVID vaccine. Pt states he is an every day smoker. Pt reports intermittent alcohol use. Denies cardiac hx. Denies hx blood clot. Denies taking blood thinners. Denies lower leg pain or swelling. Patient has been taking OTC medication without relief of symptoms. There are no other complaints, changes, or physical findings at this time. PCP: None    No current facility-administered medications on file prior to encounter. Current Outpatient Medications on File Prior to Encounter   Medication Sig Dispense Refill    cyclobenzaprine (FLEXERIL) 10 mg tablet Take 1 Tab by mouth three (3) times daily as needed for Muscle Spasm(s). 12 Tab 0       Past History     Past Medical History:  No past medical history on file. Past Surgical History:  No past surgical history on file. Family History:  No family history on file. Social History:  Social History     Tobacco Use    Smoking status: Current Every Day Smoker    Smokeless tobacco: Not on file   Substance Use Topics    Alcohol use: Yes    Drug use: No       Allergies:  No Known Allergies      Review of Systems   Review of Systems   Constitutional: Negative for chills and fever. HENT: Negative for facial swelling. Eyes: Negative for photophobia and visual disturbance. Respiratory: Positive for cough and shortness of breath. Cardiovascular: Negative for chest pain.    Gastrointestinal: Negative for abdominal pain, nausea and vomiting. Genitourinary: Negative for flank pain. Musculoskeletal:        Right rib pain   Skin: Negative for color change, pallor, rash and wound. Neurological: Negative for dizziness, weakness, light-headedness and headaches. All other systems reviewed and are negative. Physical Exam   Physical Exam  Vitals and nursing note reviewed. Constitutional:       General: He is not in acute distress. Appearance: He is well-developed. Comments: Pt in NAD   HENT:      Head: Normocephalic and atraumatic. Eyes:      Conjunctiva/sclera: Conjunctivae normal.   Cardiovascular:      Rate and Rhythm: Regular rhythm. Tachycardia present. Heart sounds: Normal heart sounds. Pulmonary:      Effort: Pulmonary effort is normal. Tachypnea present. No respiratory distress. Breath sounds: Normal breath sounds. Comments: Lungs CTA  Chest:       Abdominal:      General: Bowel sounds are normal.      Palpations: Abdomen is soft. Tenderness: There is no abdominal tenderness. Musculoskeletal:         General: Normal range of motion. Skin:     General: Skin is warm. Findings: No rash. Neurological:      Mental Status: He is alert and oriented to person, place, and time. Cranial Nerves: No cranial nerve deficit.    Psychiatric:         Behavior: Behavior normal.         Diagnostic Study Results     Labs -     Recent Results (from the past 12 hour(s))   EKG, 12 LEAD, INITIAL    Collection Time: 01/19/22 10:14 AM   Result Value Ref Range    Ventricular Rate 115 BPM    Atrial Rate 115 BPM    P-R Interval 142 ms    QRS Duration 66 ms    Q-T Interval 322 ms    QTC Calculation (Bezet) 445 ms    Calculated P Axis 74 degrees    Calculated R Axis 62 degrees    Calculated T Axis 71 degrees    Diagnosis       Poor data quality, interpretation may be adversely affected  Sinus tachycardia  Otherwise normal ECG  No previous ECGs available     CBC WITH AUTOMATED DIFF    Collection Time: 01/19/22 10:46 AM   Result Value Ref Range    WBC 9.6 4.6 - 13.2 K/uL    RBC 3.77 (L) 4.35 - 5.65 M/uL    HGB 11.7 (L) 13.0 - 16.0 g/dL    HCT 35.3 (L) 36.0 - 48.0 %    MCV 93.6 78.0 - 100.0 FL    MCH 31.0 24.0 - 34.0 PG    MCHC 33.1 31.0 - 37.0 g/dL    RDW 11.9 11.6 - 14.5 %    PLATELET 933 (H) 893 - 420 K/uL    MPV 9.8 9.2 - 11.8 FL    NRBC 0.0 0  WBC    ABSOLUTE NRBC 0.00 0.00 - 0.01 K/uL    NEUTROPHILS 72 40 - 73 %    LYMPHOCYTES 14 (L) 21 - 52 %    MONOCYTES 13 (H) 3 - 10 %    EOSINOPHILS 0 0 - 5 %    BASOPHILS 0 0 - 2 %    IMMATURE GRANULOCYTES 1 (H) 0.0 - 0.5 %    ABS. NEUTROPHILS 6.8 1.8 - 8.0 K/UL    ABS. LYMPHOCYTES 1.4 0.9 - 3.6 K/UL    ABS. MONOCYTES 1.2 0.05 - 1.2 K/UL    ABS. EOSINOPHILS 0.0 0.0 - 0.4 K/UL    ABS. BASOPHILS 0.0 0.0 - 0.1 K/UL    ABS. IMM. GRANS. 0.1 (H) 0.00 - 0.04 K/UL    DF AUTOMATED     METABOLIC PANEL, COMPREHENSIVE    Collection Time: 01/19/22 10:46 AM   Result Value Ref Range    Sodium 135 (L) 136 - 145 mmol/L    Potassium 3.9 3.5 - 5.5 mmol/L    Chloride 100 100 - 111 mmol/L    CO2 29 21 - 32 mmol/L    Anion gap 6 3.0 - 18 mmol/L    Glucose 111 (H) 74 - 99 mg/dL    BUN 7 7.0 - 18 MG/DL    Creatinine 0.60 0.6 - 1.3 MG/DL    BUN/Creatinine ratio 12 12 - 20      GFR est AA >60 >60 ml/min/1.73m2    GFR est non-AA >60 >60 ml/min/1.73m2    Calcium 9.1 8.5 - 10.1 MG/DL    Bilirubin, total 0.6 0.2 - 1.0 MG/DL    ALT (SGPT) 13 (L) 16 - 61 U/L    AST (SGOT) 15 10 - 38 U/L    Alk.  phosphatase 69 45 - 117 U/L    Protein, total 7.3 6.4 - 8.2 g/dL    Albumin 3.1 (L) 3.4 - 5.0 g/dL    Globulin 4.2 (H) 2.0 - 4.0 g/dL    A-G Ratio 0.7 (L) 0.8 - 1.7     TROPONIN-HIGH SENSITIVITY    Collection Time: 01/19/22 10:46 AM   Result Value Ref Range    Troponin-High Sensitivity 4 0 - 78 ng/L   D DIMER    Collection Time: 01/19/22 10:46 AM   Result Value Ref Range    D DIMER 2.62 (H) <0.46 ug/ml(FEU)   NT-PRO BNP    Collection Time: 01/19/22 10:46 AM   Result Value Ref Range    NT pro- 0 - 900 PG/ML       Radiologic Studies -   CTA CHEST W OR W WO CONT   Final Result           1. Occlusive right lower lobe intersegmental artery level PE extending into the   posterior and lateral basal segmental arteries resulting in a fairly large area   of ischemic changes as evidenced by developing ground glass opacities. 2.  Subtle peripheral nodular tiny groundglass densities or nodular opacities in   both lungs. Overall pattern is nonspecific, and may occur with a variety of   infectious or noninfectious processes. Classification indeterminant. 3.  Small right pleural effusion. Called E.D. at 14:30 and provided above findings to Dr. Jackie Almodovar. XR CHEST PORT   Final Result    IMPRESSION:      1. Sequela of moderate congestion. Follow-up with plain imaging. CT Results  (Last 48 hours)               01/19/22 1322  CTA CHEST W OR W WO CONT Final result    Impression:          1. Occlusive right lower lobe intersegmental artery level PE extending into the   posterior and lateral basal segmental arteries resulting in a fairly large area   of ischemic changes as evidenced by developing ground glass opacities. 2.  Subtle peripheral nodular tiny groundglass densities or nodular opacities in   both lungs. Overall pattern is nonspecific, and may occur with a variety of   infectious or noninfectious processes. Classification indeterminant. 3.  Small right pleural effusion. Called E.D. at 14:30 and provided above findings to Dr. Jackie Almodovar. Narrative:  EXAM:  CTA Chest with Contrast (Study for PE). CLINICAL INDICATION:  Shortness of breath and chest pain. COVID-19 infection. COMPARISON:  None. TECHNIQUE:  Helical volumetric sections of the chest are obtained with CT   pulmonary angiogram protocol following 80 mL Isovue-370. Subsequently, sagittal   and coronal multiplanar reconstruction images are obtained.   Maximum intensity   projection images are generated to better delineate the pulmonary vasculature,   differentiate between the pulmonary arteries and veins and to increase   sensitivity to pulmonary emboli. Radiation dose optimization techniques are   utilized as appropriate to the exam, with combination of automated exposure   control, adjustment of the mA and/or kV according to patient's size (Including   appropriate matching for site-specific examinations), or use of iterative   reconstruction technique. FINDINGS:        Pulmonary Arteries:         - Occlusive right lower lobe intersegmental artery PE between the anterior basal   segmental artery and the posterior segmental artery extending into the lateral   and posterior basal segmental arteries, associated with developing focal area of   groundglass opacities encompassing an area of about 9.0 x 6.0 cm. This is in   turn associated with minimal right pleural effusion.   - No significant reflux of contrast into the IVC. No interventricular septal   deviation or abnormal increase in the RV/LV ratio. Mild pulmonary artery   dilation, measuring up to about 3.2 cm. Lung, Pleura, Airways:       - Right lower lobe consolidative groundglass opacity as described, presumably   related to the ovary parenchymal ischemia from the occlusive PE.   - Mild right pleural effusion.   - Multiple patchy peripheral nodular groundglass opacities. - Left lower lobe atelectatic changes. Mediastinum:  No mediastinal adenopathy. Aorta:  No evidence of aortic dissection or aneurysm. Base of Neck:  No acute findings. Axillae:  Unremarkable. Upper Abdomen: The visualized contents of the abdomen are within normal limits. Skeletal Structures:  No acute findings. CXR Results  (Last 48 hours)               01/19/22 1040  XR CHEST PORT Final result    Impression:   IMPRESSION:       1. Sequela of moderate congestion. Follow-up with plain imaging. Narrative:  HISTORY: Chest pain. EXAM: Chest.       TECHNIQUE: Two views of the chest were obtained. COMPARISON: 5/16/2016       FINDINGS: Moderate bilateral diffuse interstitial prominence with associated   minimal perihilar and bibasilar opacities. No pneumothorax or pleural effusions. Heart and mediastinal structures are unremarkable. . Visualized bony thorax and   soft tissues are within normal limits. Medical Decision Making   I am the first provider for this patient. I reviewed the vital signs, available nursing notes, past medical history, past surgical history, family history and social history. Vital Signs-Reviewed the patient's vital signs. Patient Vitals for the past 12 hrs:   Temp Pulse Resp BP SpO2   01/19/22 1028 -- (!) 103 -- -- --   01/19/22 1027 -- -- -- -- 97 %   01/19/22 1026 98.3 °F (36.8 °C) (!) 106 (!) 32 110/65 --         EKG interpretation: (Preliminary)  Rhythm: sinus tachycardia; and regular . Rate (approx.): 115; Axis: normal; WV interval: normal; QRS interval: normal ; ST/T wave: normal.     No acute ischemic changes. Records Reviewed: Nursing Notes, Old Medical Records, Previous Radiology Studies and Previous Laboratory Studies    Provider Notes (Medical Decision Making):   DDx; COVID, ASC, PNA, PE    58 yo M who is COVID-positive presenting with worsening cough and shortness of breath. On exam patient tachypneic and tachycardic. Normal SpO2. Lungs CTA. CTA shows PE without right heart strain. Heparin ordered. Pt admitted for further management. ED Course:   Initial assessment performed. The patients presenting problems have been discussed, and they are in agreement with the care plan formulated and outlined with them. I have encouraged them to ask questions as they arise throughout their visit. 1439: Dr Manpreet Atkins spoke with radiologist, who states pt's has PE.  Heparin bolus and drip ordered  1449: Spoke with Dr Zee Carey, consult hospitalist. Discussed pt's COVID infection with new PE. Discussed heparin has been ordered. She agreed to admit patient. Disposition:  Admitted    PLAN:  1. Current Discharge Medication List        2. Follow-up Information    None       Return to ED if worse     Diagnosis     Clinical Impression:   1. Acute pulmonary embolism without acute cor pulmonale, unspecified pulmonary embolism type ([facility] [address]    2. COVID-19 virus infection        Attestations:    JACKIE Nolan    Please note that this dictation was completed with [employer], the computer voice recognition software. Quite often unanticipated grammatical, syntax, homophones, and other interpretive errors are inadvertently transcribed by the computer software. Please disregard these errors. Please excuse any errors that have escaped final proofreading. Thank you.

## 2022-01-19 NOTE — ED TRIAGE NOTES
Pt tested positive for COVID last week, comes in today with productive cough that \"isn't getting any better. \" Pt also endorses right side pain.

## 2022-01-19 NOTE — Clinical Note
Status[de-identified] INPATIENT [101]   Type of Bed: Telemetry [19]   Cardiac Monitoring Required?: Yes   Inpatient Hospitalization Certified Necessary for the Following Reasons: 3.  Patient receiving treatment that can only be provided in an inpatient setting (further clarification in H&P documentation)   Admitting Diagnosis: Pulmonary emboli [facility] [[id number]]   Admitting Diagnosis: COVID-19 virus infection [[id number]]   Admitting Physician: Jose Juan Murdock   Attending Physician: Princess Sosa [[id number]]   Estimated Length of Stay: 3-4 Midnights   Discharge Plan[de-identified] Home with Office Follow-up

## 2022-01-20 ENCOUNTER — APPOINTMENT (OUTPATIENT)
Dept: NON INVASIVE DIAGNOSTICS | Age: 62
DRG: 137 | End: 2022-01-20
Attending: PHYSICIAN ASSISTANT
Payer: MEDICAID

## 2022-01-20 ENCOUNTER — APPOINTMENT (OUTPATIENT)
Dept: VASCULAR SURGERY | Age: 62
DRG: 137 | End: 2022-01-20
Attending: REGISTERED NURSE
Payer: MEDICAID

## 2022-01-20 VITALS
HEIGHT: 69 IN | DIASTOLIC BLOOD PRESSURE: 75 MMHG | OXYGEN SATURATION: 98 % | SYSTOLIC BLOOD PRESSURE: 117 MMHG | HEART RATE: 75 BPM | TEMPERATURE: 99.3 F | WEIGHT: 125 LBS | BODY MASS INDEX: 18.51 KG/M2 | RESPIRATION RATE: 19 BRPM

## 2022-01-20 PROBLEM — I26.99 PULMONARY EMBOLUS (HCC): Status: ACTIVE | Noted: 2022-01-20

## 2022-01-20 LAB
ANION GAP SERPL CALC-SCNC: 7 MMOL/L (ref 3–18)
ATRIAL RATE: 115 BPM
BUN SERPL-MCNC: 10 MG/DL (ref 7–18)
BUN/CREAT SERPL: 16 (ref 12–20)
CALCIUM SERPL-MCNC: 9.4 MG/DL (ref 8.5–10.1)
CALCULATED P AXIS, ECG09: 74 DEGREES
CALCULATED R AXIS, ECG10: 62 DEGREES
CALCULATED T AXIS, ECG11: 71 DEGREES
CHLORIDE SERPL-SCNC: 99 MMOL/L (ref 100–111)
CO2 SERPL-SCNC: 27 MMOL/L (ref 21–32)
CREAT SERPL-MCNC: 0.62 MG/DL (ref 0.6–1.3)
DIAGNOSIS, 93000: NORMAL
ERYTHROCYTE [DISTWIDTH] IN BLOOD BY AUTOMATED COUNT: 12 % (ref 11.6–14.5)
GLUCOSE SERPL-MCNC: 108 MG/DL (ref 74–99)
HCT VFR BLD AUTO: 34.6 % (ref 36–48)
HGB BLD-MCNC: 11.4 G/DL (ref 13–16)
MCH RBC QN AUTO: 31.1 PG (ref 24–34)
MCHC RBC AUTO-ENTMCNC: 32.9 G/DL (ref 31–37)
MCV RBC AUTO: 94.3 FL (ref 78–100)
NRBC # BLD: 0 K/UL (ref 0–0.01)
NRBC BLD-RTO: 0 PER 100 WBC
P-R INTERVAL, ECG05: 142 MS
PLATELET # BLD AUTO: 377 K/UL (ref 135–420)
PMV BLD AUTO: 9.7 FL (ref 9.2–11.8)
POTASSIUM SERPL-SCNC: 3.7 MMOL/L (ref 3.5–5.5)
Q-T INTERVAL, ECG07: 322 MS
QRS DURATION, ECG06: 66 MS
QTC CALCULATION (BEZET), ECG08: 445 MS
RBC # BLD AUTO: 3.67 M/UL (ref 4.35–5.65)
SODIUM SERPL-SCNC: 133 MMOL/L (ref 136–145)
TROPONIN-HIGH SENSITIVITY: 5 NG/L (ref 0–78)
VENTRICULAR RATE, ECG03: 115 BPM
WBC # BLD AUTO: 9.9 K/UL (ref 4.6–13.2)

## 2022-01-20 PROCEDURE — 85027 COMPLETE CBC AUTOMATED: CPT

## 2022-01-20 PROCEDURE — 99232 SBSQ HOSP IP/OBS MODERATE 35: CPT | Performed by: INTERNAL MEDICINE

## 2022-01-20 PROCEDURE — 65660000000 HC RM CCU STEPDOWN

## 2022-01-20 PROCEDURE — 74011000250 HC RX REV CODE- 250: Performed by: INTERNAL MEDICINE

## 2022-01-20 PROCEDURE — 93306 TTE W/DOPPLER COMPLETE: CPT

## 2022-01-20 PROCEDURE — 74011250637 HC RX REV CODE- 250/637: Performed by: PHYSICIAN ASSISTANT

## 2022-01-20 PROCEDURE — 74011000250 HC RX REV CODE- 250: Performed by: PHYSICIAN ASSISTANT

## 2022-01-20 PROCEDURE — 84484 ASSAY OF TROPONIN QUANT: CPT

## 2022-01-20 PROCEDURE — 36415 COLL VENOUS BLD VENIPUNCTURE: CPT

## 2022-01-20 PROCEDURE — 74011250636 HC RX REV CODE- 250/636: Performed by: PHYSICIAN ASSISTANT

## 2022-01-20 PROCEDURE — G0378 HOSPITAL OBSERVATION PER HR: HCPCS

## 2022-01-20 PROCEDURE — 93970 EXTREMITY STUDY: CPT

## 2022-01-20 PROCEDURE — 90471 IMMUNIZATION ADMIN: CPT

## 2022-01-20 PROCEDURE — 80048 BASIC METABOLIC PNL TOTAL CA: CPT

## 2022-01-20 PROCEDURE — 99239 HOSP IP/OBS DSCHRG MGMT >30: CPT | Performed by: HOSPITALIST

## 2022-01-20 PROCEDURE — 90686 IIV4 VACC NO PRSV 0.5 ML IM: CPT | Performed by: STUDENT IN AN ORGANIZED HEALTH CARE EDUCATION/TRAINING PROGRAM

## 2022-01-20 PROCEDURE — 74011000636 HC RX REV CODE- 636: Performed by: STUDENT IN AN ORGANIZED HEALTH CARE EDUCATION/TRAINING PROGRAM

## 2022-01-20 RX ORDER — ACETAMINOPHEN 500 MG
500 TABLET ORAL
Qty: 60 TABLET | Refills: 0 | Status: SHIPPED | OUTPATIENT
Start: 2022-01-20

## 2022-01-20 RX ORDER — AMOXICILLIN AND CLAVULANATE POTASSIUM 875; 125 MG/1; MG/1
1 TABLET, FILM COATED ORAL 2 TIMES DAILY
Qty: 14 TABLET | Refills: 0 | Status: SHIPPED | OUTPATIENT
Start: 2022-01-20 | End: 2022-01-27

## 2022-01-20 RX ORDER — ACETAMINOPHEN 325 MG/1
500 TABLET ORAL
Qty: 120 TABLET | Refills: 0 | Status: SHIPPED | OUTPATIENT
Start: 2022-01-20 | End: 2022-01-20

## 2022-01-20 RX ORDER — LIDOCAINE 4 G/100G
PATCH TOPICAL
Qty: 30 PATCH | Refills: 0 | Status: SHIPPED | OUTPATIENT
Start: 2022-01-20

## 2022-01-20 RX ADMIN — RIVAROXABAN 15 MG: 15 TABLET, FILM COATED ORAL at 16:48

## 2022-01-20 RX ADMIN — KETOROLAC TROMETHAMINE 15 MG: 15 INJECTION, SOLUTION INTRAMUSCULAR; INTRAVENOUS at 08:31

## 2022-01-20 RX ADMIN — SODIUM CHLORIDE, PRESERVATIVE FREE 10 ML: 5 INJECTION INTRAVENOUS at 06:01

## 2022-01-20 RX ADMIN — INFLUENZA VIRUS VACCINE 0.5 ML: 15; 15; 15; 15 SUSPENSION INTRAMUSCULAR at 16:09

## 2022-01-20 RX ADMIN — KETOROLAC TROMETHAMINE 15 MG: 15 INJECTION, SOLUTION INTRAMUSCULAR; INTRAVENOUS at 14:18

## 2022-01-20 RX ADMIN — RIVAROXABAN 15 MG: 15 TABLET, FILM COATED ORAL at 08:23

## 2022-01-20 NOTE — DISCHARGE SUMMARY
[facility]  [facility]       Patient: Masood Crystal Age: 57 y.o. : 1960 MR#: [id number] SSN: [id number]  PCP on record: Janis Roberts MD  Admit date: 2022  Discharge date: 2022    Disposition:    [x]Home   []Home with Home Health   []SNF/NH   []Rehab   []Home with family   []Alternate Facility:____________________    Admission Diagnoses:  Pulmonary emboli ([address] [I26.99]  COVID-19 virus infection [U07.1]  Acute pulmonary embolism ([address] [I26.99]  Pulmonary embolus ([address] [I26.99]    Discharge Diagnoses:                             Pulmonary emboli    Discharge Medications:     Current Discharge Medication List      START taking these medications    Details   lidocaine 4 % patch Apply one patch a day for 12 hours as needed  Qty: 30 Patch, Refills: 0      !! rivaroxaban (XARELTO) 15 mg tab tablet Take 1 Tablet by mouth two (2) times daily (with meals) for 40 doses. Qty: 40 Tablet, Refills: 0. Please take till       !! rivaroxaban (Xarelto) 20 mg tab tablet Take 1 Tablet by mouth daily (with breakfast) for 30 days. Indications: a clot in the lung  Qty: 30 Tablet, Refills: 0 Please start this on 10 February      acetaminophen (TYLENOL) 500 mg tablet Take 1 Tablet by mouth every six (6) hours as needed for Pain. Qty: 60 Tablet, Refills: 0      amoxicillin-clavulanate (Augmentin) 875-125 mg per tablet Take 1 Tablet by mouth two (2) times a day for 7 days. Qty: 14 Tablet, Refills: 0       !! - Potential duplicate medications found. Please discuss with provider. CONTINUE these medications which have NOT CHANGED    Details   HYDROcodone-chlorpheniramine (TUSSIONEX) 10-8 mg/5 mL suspension Take 5 mL by mouth two (2) times a day. Consults:    -  Pulmonary  Procedures:  -   None    Significant Diagnostic Studies:   -  CTA CHEST W OR W WO CONT    Result Date: 2022       1.   Occlusive right lower lobe intersegmental artery level PE extending into the posterior and lateral basal segmental arteries resulting in a fairly large area of ischemic changes as evidenced by developing ground glass opacities. 2.  Subtle peripheral nodular tiny groundglass densities or nodular opacities in both lungs. Overall pattern is nonspecific, and may occur with a variety of infectious or noninfectious processes. Classification indeterminant. 3.  Small right pleural effusion. Called E.D. at 14:30 and provided above findings to Dr. Reinier Rogel. XR CHEST PORT    Result Date: 2022   IMPRESSION: 1. Sequela of moderate congestion. Follow-up with plain imaging. Hospital Course by Problem   1. PE: Sub-massive without right heart strain,patient treated with Xarelto tolerated it well. There is evidence of developing infarct noted on CTA, patient treated empirically with Augmentin 7 days for possible CAP. 2.COVID19- Patient tested negative for COVID despite report of positive test last week, Imaging does not support active COVID infection, patient was on RA during his hospital stay, no treatment required.     Today's examination of the patient revealed:     Subjective:   Patient with mild pleuritic chest pain on the right  Objective:     Visit Vitals  /65 (BP 1 Location: Left upper arm, BP Patient Position: At rest)   Pulse 75   Temp 99.3 °F (37.4 °C)   Resp 19   Ht 5' 9\" (1.753 m)   Wt 56.7 kg (125 lb)   SpO2 98%   BMI 18.46 kg/m²      Tmax/24hrs: Temp (24hrs), Av.6 °F (37.6 °C), Min:99.3 °F (37.4 °C), Max:99.9 °F (37.7 °C)     Input/Output: No intake or output data in the 24 hours ending 22 1351    General:  Alert, NAD  Cardiovascular:  RRR,  Pulmonary:  LSC throughout; respiratory effort WNL  GI:  +BS in all four quadrants, soft, non-tender  Extremities:  No edema; 2+ dorsalis pedis pulses bilaterally  Additional:      Labs:    Recent Results (from the past 24 hour(s))   TROPONIN-HIGH SENSITIVITY    Collection Time: 22 5:00 PM   Result Value Ref Range    Troponin-High Sensitivity 5 0 - 78 ng/L   RESPIRATORY VIRUS PANEL W/COVID-19, PCR    Collection Time: 01/19/22  5:00 PM    Specimen: Nasopharyngeal   Result Value Ref Range    Adenovirus Not detected NOTD      Coronavirus 229E Not detected NOTD      Coronavirus HKU1 Not detected NOTD      Coronavirus CVNL63 Not detected NOTD      Coronavirus OC43 Not detected NOTD      SARS-CoV-2, PCR Not detected NOTD      Metapneumovirus Not detected NOTD      Rhinovirus and Enterovirus Not detected NOTD      Influenza A Not detected NOTD      Influenza A, subtype H1 Not detected NOTD      Influenza A, subtype H3 Not detected NOTD      INFLUENZA A H1N1 PCR Not detected NOTD      Influenza B Not detected NOTD      Parainfluenza 1 Not detected NOTD      Parainfluenza 2 Not detected NOTD      Parainfluenza 3 Not detected NOTD      Parainfluenza virus 4 Not detected NOTD      RSV by PCR Not detected NOTD      B. parapertussis, PCR Not detected NOTD      Bordetella pertussis - PCR Not detected NOTD      Chlamydophila pneumoniae DNA, QL, PCR Not detected NOTD      Mycoplasma pneumoniae DNA, QL, PCR Not detected NOTD     TROPONIN-HIGH SENSITIVITY    Collection Time: 01/19/22  7:04 PM   Result Value Ref Range    Troponin-High Sensitivity 5 0 - 78 ng/L   METABOLIC PANEL, BASIC    Collection Time: 01/20/22  6:11 AM   Result Value Ref Range    Sodium 133 (L) 136 - 145 mmol/L    Potassium 3.7 3.5 - 5.5 mmol/L    Chloride 99 (L) 100 - 111 mmol/L    CO2 27 21 - 32 mmol/L    Anion gap 7 3.0 - 18 mmol/L    Glucose 108 (H) 74 - 99 mg/dL    BUN 10 7.0 - 18 MG/DL    Creatinine 0.62 0.6 - 1.3 MG/DL    BUN/Creatinine ratio 16 12 - 20      GFR est AA >60 >60 ml/min/1.73m2    GFR est non-AA >60 >60 ml/min/1.73m2    Calcium 9.4 8.5 - 10.1 MG/DL   CBC W/O DIFF    Collection Time: 01/20/22  6:11 AM   Result Value Ref Range    WBC 9.9 4.6 - 13.2 K/uL    RBC 3.67 (L) 4.35 - 5.65 M/uL    HGB 11.4 (L) 13.0 - 16.0 g/dL    HCT 34.6 (L) 36.0 - 48.0 %    MCV 94.3 78.0 - 100.0 FL    MCH 31.1 24.0 - 34.0 PG    MCHC 32.9 31.0 - 37.0 g/dL    RDW 12.0 11.6 - 14.5 %    PLATELET 229 592 - 651 K/uL    MPV 9.7 9.2 - 11.8 FL    NRBC 0.0 0  WBC    ABSOLUTE NRBC 0.00 0.00 - 0.01 K/uL   TROPONIN-HIGH SENSITIVITY    Collection Time: 01/20/22  6:11 AM   Result Value Ref Range    Troponin-High Sensitivity 5 0 - 78 ng/L     Additional Data Reviewed:     Condition: Stable  Follow-up Appointments:   1.  Your PCP: Rom Rouse MD, within 7-10days  2.     >30 minutes spent coordinating this discharge (review instructions/follow-up, prescriptions, preparing report for sign off)    Signed:  Rashid Nicholas DO  1/20/2022  1:51 P  Additional Data Reviewed:

## 2022-01-20 NOTE — ED NOTES
TRANSFER - OUT REPORT:    Verbal report given to Alan Leon RN(name) on BHAK[facility] [employer]  being transferred to [address] [place] [address](unit) for routine progression of care       Report consisted of patients Situation, Background, Assessment and   Recommendations(SBAR). Information from the following report(s) SBAR, Kardex, ED Summary, Recent Results and Med Rec Status was reviewed with the receiving nurse. Lines:   Peripheral IV 01/19/22 Left Antecubital (Active)   Site Assessment Clean, dry, & intact 01/19/22 1046   Phlebitis Assessment 0 01/19/22 1046   Infiltration Assessment 0 01/19/22 1046   Dressing Status Clean, dry, & intact 01/19/22 1046   Dressing Type Transparent 01/19/22 1046   Hub Color/Line Status Pink;Flushed;Patent 01/19/22 1046   Action Taken Blood drawn 01/19/22 1046   Alcohol Cap Used No 01/19/22 1046        Opportunity for questions and clarification was provided.       Patient transported with:   [employer]

## 2022-01-20 NOTE — DISCHARGE INSTRUCTIONS
8 Things You Can Do to Help Prevent Blood Clots  A blood clot in a deep vein, usually in the legs, is called a deep vein thrombosis (DVT). A DVT can break loose and travel through the bloodstream to the lungs. Then the clot can block blood flow in the lungs (pulmonary embolism). This can be life-threatening. That's why it's important to take steps to prevent a clot. Take a blood-thinning medicine (called an anticoagulant), if prescribed. If your doctor prescribes medicine, take it as directed. Exercise your lower leg muscles. This helps keep the blood moving through your legs. Pump your feet up and down by pulling your toes up toward your knees then pointing them down. Repeat. This is a good exercise to do when you are sitting for long periods of time. Get up out of bed as soon as your doctor says it's okay. Being active is one of the best things you can do to prevent clots. Take plenty of breaks when you travel. On long car trips, stop the car and walk around every hour or so. On the bus, plane, or train, get out of your seat and walk up and down the aisle every hour, if you can. Be active. Try to get 30 minutes or more of activity on most days of the week. Don't smoke. Smoking can increase your risk of blood clots. If you need help quitting, talk to your doctor about stop-smoking programs and medicines. Check with your doctor about whether you should use hormone forms of birth control or hormone therapy. These may increase your risk of blood clots. Use compression stockings if your doctor prescribes them. These are specially fitted stockings that may prevent blood clots by keeping blood from pooling in your legs. Current as of: July 6, 2021               Content Version: 13.0  © 2006-2021 [employer]. Care instructions adapted under license by [employer] (which disclaims liability or warranty for this information).  If you have questions about a medical condition or this instruction, always ask your healthcare professional. [place] [address] any warranty or liability for your use of this information. Patient Education        8 Things You Can Do to Help Prevent Blood Clots  A blood clot in a deep vein, usually in the legs, is called a deep vein thrombosis (DVT). A DVT can break loose and travel through the bloodstream to the lungs. Then the clot can block blood flow in the lungs (pulmonary embolism). This can be life-threatening. That's why it's important to take steps to prevent a clot. Take a blood-thinning medicine (called an anticoagulant), if prescribed. If your doctor prescribes medicine, take it as directed. Exercise your lower leg muscles. This helps keep the blood moving through your legs. Pump your feet up and down by pulling your toes up toward your knees then pointing them down. Repeat. This is a good exercise to do when you are sitting for long periods of time. Get up out of bed as soon as your doctor says it's okay. Being active is one of the best things you can do to prevent clots. Take plenty of breaks when you travel. On long car trips, stop the car and walk around every hour or so. On the bus, plane, or train, get out of your seat and walk up and down the aisle every hour, if you can. Be active. Try to get 30 minutes or more of activity on most days of the week. Don't smoke. Smoking can increase your risk of blood clots. If you need help quitting, talk to your doctor about stop-smoking programs and medicines. Check with your doctor about whether you should use hormone forms of birth control or hormone therapy. These may increase your risk of blood clots. Use compression stockings if your doctor prescribes them. These are specially fitted stockings that may prevent blood clots by keeping blood from pooling in your legs.    Current as of: July 6, 2021               Content Version: 13.0  © 6346-3146 [employer]. Care instructions adapted under license by [employer] (which disclaims liability or warranty for this information). If you have questions about a medical condition or this instruction, always ask your healthcare professional. [address] any warranty or liability for your use of this information.

## 2022-01-20 NOTE — PROGRESS NOTES
conducted an initial consultation and Spiritual Assessment for Renée Osborn, who is a 57 y. o.,male. Patients Primary Language is: [place]. According to the patients EMR Sabianist Affiliation is: [place]. The reason the Patient came to the hospital is:   Patient Active Problem List    Diagnosis Date Noted    Acute pulmonary embolism ([facility] [address] 01/19/2022    Tobacco abuse 01/19/2022    COVID-19 virus infection 01/10/2022        The  provided the following Interventions:  Initiated a relationship of care and support. with patient in room 421 which is a isolation room since patient is covid positve. Listened empathically as patient talked about his being here and hopes for a speedy recovery. There is no advance directive here at this time. Provided chaplaincy education. Provided information about Spiritual Care Services. Offered prayer and assurance of continued prayers on patients behalf. Chart reviewed. The following outcomes were achieved:  Patient shared limited information about his.medical narrative. Assessment:  Patient does not have any Episcopal/cultural needs that will affect patients preferences in health care. There are no further spiritual or Episcopal issues which require Spiritual Care Services interventions at this time. Plan:  Chaplains will continue to follow and will provide pastoral care on an as needed/requested basis    . Tyronne Cooks   Spiritual Care   [phone]

## 2022-01-20 NOTE — ROUTINE PROCESS
0720- Bedside, Verbal and Written shift change report received by Storm Brady (oncoming nurse) by Brenda(offgoing nurse). Allergy band placed on pt's wrist. Report included the following information SBAR, Kardex, Intake/Output, MAR and Recent Results. 0830- Assessment completed, call bell within reach ,no distress noted. AM  medications administered, pt tolerated with ease, will continue to monitor. PRN pain medications administered, pt tolerated with ease, will continue to monitor. 1200-- Shift reassessment, pt condition unchanged, will continue to monitor. 1645-- discharge instructions given. As part of the discharge instructions, medications already given today were discussed with the patient. The next dose due of all ordered meds was highlighted as part of the medication discharge instructions. Discussed with the patient the importance of taking medications as directed, as well as the side effects and adverse reactions to medications ordered. 1745-discharged home via wheelchair.

## 2022-01-20 NOTE — ROUTINE PROCESS
Bedside and Verbal shift change report given to KAYLA Rutherford (oncoming nurse) by Florin Hernandez RN   (offgoing nurse). Report included the following information SBAR, Kardex, Intake/Output, MAR and Recent Results.

## 2022-01-20 NOTE — PROGRESS NOTES
Patient seen and evaluated, admitted by my colleague earlier yesterday for shortness of breath and patient mentions he was recently diagnosed with COVID-19 infection. CTA chest positive for occlusive right lower lobe into segmental artery level PE extending into the posterior and lateral basal segmental arteries resulting in a fairly large area of ischemic changes. Ultrasound duplex lower extremities completed, results pending. Appreciate pulmonology consult. Patient has no oxygen requirement at this time. Anticipate discharge on p.o. Xarelto and advised to closely follow-up with PCP in 1 to 2 weeks.

## 2022-01-20 NOTE — PROGRESS NOTES
Nutrition Assessment (Disaster Mode)    Type and Reason for Visit: Initial (BMI)    Nutrition Recommendations/Plan:   - Add supplement: Magic Cup TID  - Check weight when able; order placed. - Continue all other nutrition interventions. Encourage/ monitor po intake of meals and supplements. Malnutrition Assessment:  Malnutrition Status: At risk for malnutrition (specify) (wt loss PTA per pt report)    Nutrition Assessment:   Nutrition Assessment: Pt reported fair appetite/ meal intake x 2 days PTA and since admission. Agreeable to nutrition supplement; declined ensure drink, but agreeable to magic cup. Per chart hx, pt weighing 140 lb in July 2021 and 125 lb (stated wt) on 1/20/22. But pt now current wt of 135 lb. Will place order for nursing to obtain wt when able. Is covid positive. Denied experiencing any changes in taste or smell sensation    Nutrition Related Findings: BM 1/17. No edema. Nutrition History and Allergies: Past medical hx:   covid 19 infection. Appetite/ meal intake fair x 2 days PTA. Usually has good appetite/ meal intake, eating 3 meals daily. Pt reported recent wt of 135 lb, but upon admission reported wt of 125 lb. Per chart hx, noted pt weighing 140 lb on 7/17/2021; with net wt loss of 15 lb, 10.7% x 6 month PTA per admission stated wt. No known food allergies  Cultural/Adventist/Ethnic Food Preference(s): None     Total Energy Requirements (kcals/day): [id number] Energy Requirements Based On: Sania Hooper (x1.2-1.3) Weight Used for Energy Requirements: Ideal (72.5 kg)  Total Protein Requirements (g/day): 58-73 Weight Used for Protein Requirements: Ideal (x0.8-1)  Total Fluid Requirements (ml/day): 2787-7505 Method Used for Fluid Requirements: 1 ml/kcal    Current Nutrition Therapies:  ADULT DIET Regular     Anthropometric Measures:  Height: 5' 9\" (175.3 cm) Weight: 56.7 kg (125 lb) Body mass index is 18.46 kg/m².   Admission Body Weight: 125 lb  Ideal Body Weight (lbs) (Calculated): 160 lbs Ideal Body Weight (Kg) (Calculated): 73 kg % IBW (Calculated): 78.1 %  Usual Body Weight:  (140 lb in July 2021)              Nutrition Diagnosis:   · Unintended weight loss related to inadequate protein-energy intake as evidenced by weight loss     Nutrition Interventions:   Food and/or Nutrient Delivery: Continue current diet,Start oral nutrition supplement  Nutrition Education/Counseling: Education not indicated,No recommendations at this time  Coordination of Nutrition Care: Continue to monitor while inpatient       Goals: PO nutrition intake will meet >75% of patient's estimated nutrition needs within the next 7 days    Nutrition Monitoring and Evaluation: Patient will be monitored per nutrition standards of care. Consult Dietitian if nutrition intervention essential to patient care is needed.    Behavioral-Environmental Outcomes: None identified  Food/Nutrient Intake Outcomes: Food and nutrient intake,Supplement intake  Physical Signs/Symptoms Outcomes: Biochemical data,Meal time behavior,Weight    Discharge Planning: Continue oral nutrition supplement,Continue current diet    Jessica Henson RD on 1/20/2022 at 2:21 PM  Contact: [phone]    Disaster Mode Active

## 2022-01-20 NOTE — PROGRESS NOTES
[facility]  Pulmonary, Critical Care, and Sleep Medicine    Name: Vicie Primrose MRN: [id number]   : 1960 Hospital: [address] [place] [address] Dr   Date: 2022        IMPRESSION:   · Acute PE- hemodynamically stable on room air. CT (): Right lower lobe intersegmental artery PE extending to posterior and lateral basal segmental PE. PESI score:92, intermediate risk  · COVID + with multifocal pneumonia- tested positive last week with PCP, unvaccinated  · Pleurtic chest pain  · Tobacco abuse- 1/2 pk smoker for about 30 years  · ETOH abuse- last drink on , typically drinks beer     Patient Active Problem List   Diagnosis Code    Acute pulmonary embolism ([facility] [address] I26.99    COVID-19 virus infection U07.1    Tobacco abuse Z72.0      PLAN:   PLAN:   Neuro: No acute needs, neuro checks per routine  Pulm: Supplemental O2 via NC, titrate flow for goal SPO2> 90%  Aspiration precautions, Keep HOB >30 degrees. COVID appears stable, no consolidation in CXR. Heparin ggt per PE protocol, low risk for bleed. ICS, lidocaine patch 6 minute walk test prior to discharge  [employer] Monitor HD, currently stable, Check cardiac panel, ECHO  to check for RV strain. Troponin and BNP negative, will trend troponin x 3  GI: SUP, Trend LFTs, Zofran PRN for N/V, Diet/NPO  Renal:  Trend Renal indices, Strict Is/Os,  Hem/Onc: Daily CBC. Monitor for s/o active bleeding. I/D:Trend WBCs and temperature curve. No needs for antibiotics  Metabolic:  Daily BMP, mag, phos. Trend lytes, replace as needed. Musc/Skin: no acute issues     Full Code  Discussed w/ attending physician   This note has been prepared for physician consultation. ·      Subjective/Interval History:     Reports some SOB this morning and difficulty with deep breaths related to pain in his right chest    ROS:Pertinent items are noted in HPI.     Objective:   Vital Signs:    Visit Vitals  /75 (BP 1 Location: Left upper arm, BP Patient Position: At rest)   Pulse 81   Temp 99.9 °F (37.7 °C)   Resp 19   Ht 5' 9\" (1.753 m)   Wt 56.7 kg (125 lb)   SpO2 97%   BMI 18.46 kg/m²       O2 Device: None (Room air)       Temp (24hrs), Av.3 °F (37.4 °C), Min:98.3 °F (36.8 °C), Max:99.9 °F (37.7 °C)       Intake/Output:   Last shift:      No intake/output data recorded. Last 3 shifts: No intake/output data recorded. No intake or output data in the 24 hours ending 22 0935     Physical Exam:    General: in no apparent distress   HEENT: Normal   Neck: No abnormally enlarged lymph nodes. Chest: normal   Lungs: normal air entry   Heart: Regular rate and rhythm   Abdomen: abdomen is soft without significant tenderness, masses, organomegaly or guarding   Extremity: negative   Neuro: alert   Skin: Skin color, texture, turgor normal. No rashes or lesions        DATA:  Labs:  Recent Labs     22  0611 22  1046   WBC 9.9 9.6   HGB 11.4* 11.7*   HCT 34.6* 35.3*    424*     Recent Labs     22  0611 22  1046   * 135*   K 3.7 3.9   CL 99* 100   CO2 27 29   * 111*   BUN 10 7   CREA 0.62 0.60   CA 9.4 9.1   ALB  --  3.1*   ALT  --  13*     No results for input(s): PH, PCO2, PO2, HCO3, FIO2 in the last 72 hours. No results found for: TSH, TSHEXT, TSH2, TSH3, TSHP, TSHELE, TT3, T3U, T3UP, FRT3, FT3, T3T, FT4, FT4P, T4, T4P, FT4T, TT7, TSHEXT     No results found for: CPK, RCK1, RCK2, RCK3, RCK4, CKMB, CKNDX, CKND1, TROPT, TROIQ, BNPP, BNP  Results for Terrence Herrera (MRN [id number]) as of 2022 16:46   Ref.  Range 2022 10:46 2022 17:00 2022 19:04 2022 06:11   Troponin-High Sensitivity Latest Ref Range: 0 - 78 ng/L 4 5 5 5       Lab Results   Component Value Date/Time    NT pro- 2022 10:46 AM         MICRO:  Results     Procedure Component Value Units Date/Time    RESPIRATORY VIRUS PANEL W/COVID-19, PCR [[id number]] Collected: 22 1700    Order Status: Completed Specimen: Nasopharyngeal Updated: 01/19/22 1804     Adenovirus Not detected        Coronavirus 229E Not detected        Coronavirus HKU1 Not detected        Coronavirus CVNL63 Not detected        Coronavirus OC43 Not detected        SARS-CoV-2, PCR Not detected        Metapneumovirus Not detected        Rhinovirus and Enterovirus Not detected        Influenza A Not detected        Influenza A, subtype H1 Not detected        Influenza A, subtype H3 Not detected        INFLUENZA A H1N1 PCR Not detected        Influenza B Not detected        Parainfluenza 1 Not detected        Parainfluenza 2 Not detected        Parainfluenza 3 Not detected        Parainfluenza virus 4 Not detected        RSV by PCR Not detected        B. parapertussis, PCR Not detected        Bordetella pertussis - PCR Not detected        Chlamydophila pneumoniae DNA, QL, PCR Not detected        Mycoplasma pneumoniae DNA, QL, PCR Not detected               Imaging:    CXR Results  (Last 48 hours)               01/19/22 1040  XR CHEST PORT Final result    Impression:   IMPRESSION:       1. Sequela of moderate congestion. Follow-up with plain imaging. Narrative:  HISTORY: Chest pain. EXAM: Chest.       TECHNIQUE: Two views of the chest were obtained. COMPARISON: 5/16/2016       FINDINGS: Moderate bilateral diffuse interstitial prominence with associated   minimal perihilar and bibasilar opacities. No pneumothorax or pleural effusions. Heart and mediastinal structures are unremarkable. . Visualized bony thorax and   soft tissues are within normal limits. CT Results (most recent):  Results from Hospital Encounter encounter on 01/19/22    CTA CHEST W OR W WO CONT    Narrative  EXAM:  CTA Chest with Contrast (Study for PE). CLINICAL INDICATION:  Shortness of breath and chest pain. COVID-19 infection. COMPARISON:  None.     TECHNIQUE:  Helical volumetric sections of the chest are obtained with CT  pulmonary angiogram protocol following 80 mL Isovue-370. Subsequently, sagittal  and coronal multiplanar reconstruction images are obtained. Maximum intensity  projection images are generated to better delineate the pulmonary vasculature,  differentiate between the pulmonary arteries and veins and to increase  sensitivity to pulmonary emboli. Radiation dose optimization techniques are  utilized as appropriate to the exam, with combination of automated exposure  control, adjustment of the mA and/or kV according to patient's size (Including  appropriate matching for site-specific examinations), or use of iterative  reconstruction technique. FINDINGS:    Pulmonary Arteries:    - Occlusive right lower lobe intersegmental artery PE between the anterior basal  segmental artery and the posterior segmental artery extending into the lateral  and posterior basal segmental arteries, associated with developing focal area of  groundglass opacities encompassing an area of about 9.0 x 6.0 cm. This is in  turn associated with minimal right pleural effusion.  - No significant reflux of contrast into the IVC. No interventricular septal  deviation or abnormal increase in the RV/LV ratio. Mild pulmonary artery  dilation, measuring up to about 3.2 cm. Lung, Pleura, Airways:    - Right lower lobe consolidative groundglass opacity as described, presumably  related to the ovary parenchymal ischemia from the occlusive PE.  - Mild right pleural effusion.  - Multiple patchy peripheral nodular groundglass opacities. - Left lower lobe atelectatic changes. Mediastinum:  No mediastinal adenopathy. Aorta:  No evidence of aortic dissection or aneurysm. Base of Neck:  No acute findings. Axillae:  Unremarkable. Upper Abdomen: The visualized contents of the abdomen are within normal limits. Skeletal Structures:  No acute findings. Impression  1.   Occlusive right lower lobe intersegmental artery level PE extending into the  posterior and lateral basal segmental arteries resulting in a fairly large area  of ischemic changes as evidenced by developing ground glass opacities. 2.  Subtle peripheral nodular tiny groundglass densities or nodular opacities in  both lungs. Overall pattern is nonspecific, and may occur with a variety of  infectious or noninfectious processes. Classification indeterminant. 3.  Small right pleural effusion. Called E.D. at 14:30 and provided above findings to Dr. Pallavi Warner. []I have personally reviewed the patients radiographs  []Radiographs reviewed with radiologist   []No change from prior, tubes and lines in adequate position  []Improved   []Worsening    High complexity decision making was performed during the evaluation of this patient at high risk for decompensation with multiple organ involvement     Above mentioned total time spent on reviewing the case/medical record/data/notes/EMR/patient examination/documentation/coordinating care with nurse/consultants, exclusive of procedures with complex decision making performed and > 50% time spent in face to face evaluation. Eugenia El MD  Pulmonary & Critical Care Fellow  9:35 AM      [facility] Staff Note     I have independently evaluated the patient at the bedside this afternoon    I agree with the above evaluation, assessment and recommendations along with the following comments and observations. I have made editions to above evaluation which was performed under my direction. Please also review my orders. Chart and labs reviewed    Patient resting comfortably on room air. Patient has no complaints at this time. Tolerating PO. Echo and PVL results still pending. Placed on Xarelto by primary team.     Patient will need systemic anticoagulation for at least 3 months. Case discussed with Dr. Jonny Amin.   If patient does not have oxygen requirement- he does not have one at this time at rest. I have asked that patient be ambulated to ensure that he does not have an oxygen requirement. Follow up echo and PVLs. Clinically he looks like he can discharge home. He will need OP pulmonary and PCP follow up. Smoking cessation encouraged    Stop Toradol while on anticoagulation      Complex decision making was made in the evaluation and management plans during this consultation. More than 50% of time was spent in counseling and coordination of care including review of data and discussion with other team members. Further recommendations and therapies pending clinical course.          Alexandra Husain DO, FCCP  Pulmonary, Sleep and Critical Care Medicine  4:46 PM

## 2022-01-21 ENCOUNTER — PATIENT OUTREACH (OUTPATIENT)
Dept: CASE MANAGEMENT | Age: 62
End: 2022-01-21

## 2022-01-21 LAB
ECHO AO ROOT DIAM: 3.8 CM
ECHO AO ROOT INDEX: 2.25 CM/M2
ECHO LA VOL 2C: 48 ML (ref 18–58)
ECHO LA VOL 4C: 19 ML (ref 18–58)
ECHO LA VOLUME AREA LENGTH: 35 ML
ECHO LA VOLUME INDEX A2C: 28 ML/M2 (ref 16–34)
ECHO LA VOLUME INDEX A4C: 11 ML/M2 (ref 16–34)
ECHO LA VOLUME INDEX AREA LENGTH: 21 ML/M2 (ref 16–34)
ECHO LV E' SEPTAL VELOCITY: 11 CM/S
ECHO LV FRACTIONAL SHORTENING: 16 % (ref 28–44)
ECHO LV INTERNAL DIMENSION DIASTOLE INDEX: 2.25 CM/M2
ECHO LV INTERNAL DIMENSION DIASTOLIC: 3.8 CM (ref 4.2–5.9)
ECHO LV INTERNAL DIMENSION SYSTOLIC INDEX: 1.89 CM/M2
ECHO LV INTERNAL DIMENSION SYSTOLIC: 3.2 CM
ECHO LV IVSD: 0.9 CM (ref 0.6–1)
ECHO LV MASS 2D: 101.1 G (ref 88–224)
ECHO LV MASS INDEX 2D: 59.8 G/M2 (ref 49–115)
ECHO LV POSTERIOR WALL DIASTOLIC: 0.9 CM (ref 0.6–1)
ECHO LV RELATIVE WALL THICKNESS RATIO: 0.47
ECHO LVOT AREA: 3.5 CM2
ECHO LVOT DIAM: 2.1 CM
ECHO LVOT MEAN GRADIENT: 2 MMHG
ECHO LVOT PEAK GRADIENT: 5 MMHG
ECHO LVOT PEAK VELOCITY: 1.1 M/S
ECHO LVOT STROKE VOLUME INDEX: 41.6 ML/M2
ECHO LVOT SV: 70.3 ML
ECHO LVOT VTI: 20.3 CM
ECHO MV A VELOCITY: 1.03 M/S
ECHO MV E DECELERATION TIME (DT): 276.2 MS
ECHO MV E VELOCITY: 0.87 M/S
ECHO MV E/A RATIO: 0.84
ECHO MV E/E' SEPTAL: 7.91
ECHO RV TAPSE: 2.4 CM (ref 1.5–2)
ECHO TV REGURGITANT MAX VELOCITY: 2.43 M/S
ECHO TV REGURGITANT PEAK GRADIENT: 24 MMHG

## 2022-01-21 NOTE — PROGRESS NOTES
Transitions of Care Call  Call within 2 business days of discharge: Yes, but unable to reach patient. Care Transitions Nurse (CTN) made two separate attempts to contact patient to complete transitions of care assessment and follow up. Unable to reach patient. Left [facility] requesting a return call. Plan to try to contact patient again within the next 5 business days. \     Patient: Zafar Mendes Patient : 1960 MRN: [id number]    Last Discharge [address]       Complaint Diagnosis Description Type Department Provider    22 Cough Acute pulmonary embolism without acute cor pulmonale, unspecified pulmonary embolism type ([facility] [address] . .. ED to Hosp-Admission (Discharged) (ADMIT) Simi Michel MD; Julian Stephens... Patient was admitted to [facility] from 22 to 22 for COVID PNA and acute PE. Was this an external facility discharge? No Discharge Facility: [facility]    COVID-19 related testing was available at this time. Test results were positive on 1/10/22 and negative on 22. Do you have what you need at home?  Durable Medical Equipment ordered at discharge: None   Home Health/Outpatient orders at discharge: none     Follow up appointment scheduled within 7 days of discharge: no. If no appointment scheduled, scheduling offered: n/a. No future appointments.       Carl Galvan RN

## 2022-01-26 ENCOUNTER — PATIENT OUTREACH (OUTPATIENT)
Dept: CASE MANAGEMENT | Age: 62
End: 2022-01-26

## 2022-01-26 NOTE — PROGRESS NOTES
Care Transitions Nurse (CTN) made a third attempt to contact patient to complete transitions of care assessment and follow up. Unable to reach. Left [facility] requesting a return call. Resolving COVID Care Transitions episode. Patient has CTN's contact information.

## 2022-04-25 PROBLEM — M75.110 PARTIAL THICKNESS ROTATOR CUFF TEAR: Status: ACTIVE | Noted: 2022-04-25

## 2022-04-25 PROBLEM — H54.7 REDUCED VISUAL ACUITY: Status: ACTIVE | Noted: 2022-04-25

## 2022-04-25 PROBLEM — M25.519 SHOULDER PAIN: Status: ACTIVE | Noted: 2022-04-25

## 2022-04-25 PROBLEM — M75.100 ROTATOR CUFF SYNDROME: Status: ACTIVE | Noted: 2022-04-25

## 2022-05-05 ENCOUNTER — OFFICE VISIT (OUTPATIENT)
Dept: PULMONOLOGY | Age: 62
End: 2022-05-05
Payer: MEDICAID

## 2022-05-05 VITALS
HEIGHT: 69 IN | OXYGEN SATURATION: 99 % | BODY MASS INDEX: 20.65 KG/M2 | HEART RATE: 68 BPM | TEMPERATURE: 98.8 F | SYSTOLIC BLOOD PRESSURE: 116 MMHG | RESPIRATION RATE: 16 BRPM | DIASTOLIC BLOOD PRESSURE: 75 MMHG | WEIGHT: 139.4 LBS

## 2022-05-05 DIAGNOSIS — U07.1 COVID-19: ICD-10-CM

## 2022-05-05 DIAGNOSIS — Z72.0 TOBACCO ABUSE: ICD-10-CM

## 2022-05-05 DIAGNOSIS — I26.99 OTHER ACUTE PULMONARY EMBOLISM WITHOUT ACUTE COR PULMONALE (HCC): Primary | ICD-10-CM

## 2022-05-05 DIAGNOSIS — Z87.891 PERSONAL HISTORY OF TOBACCO USE, PRESENTING HAZARDS TO HEALTH: ICD-10-CM

## 2022-05-05 PROCEDURE — 99214 OFFICE O/P EST MOD 30 MIN: CPT | Performed by: INTERNAL MEDICINE

## 2022-05-05 PROCEDURE — 99406 BEHAV CHNG SMOKING 3-10 MIN: CPT | Performed by: INTERNAL MEDICINE

## 2022-05-05 RX ORDER — DICLOFENAC SODIUM 10 MG/G
4 GEL TOPICAL EVERY 6 HOURS
COMMUNITY
Start: 2022-04-26

## 2022-05-05 RX ORDER — MULTIVITAMIN
1 CAPSULE ORAL DAILY
COMMUNITY
Start: 2022-04-26

## 2022-05-05 NOTE — LETTER
5/10/2022    Patient: Pranay Cornell   YOB: 1960   Date of Visit: 5/5/2022     Glenny Miles MD  [address] [facility] [place] [address] [id number]  Via Fax: [phone]    Dear Glenny Miles MD,      Thank you for referring Mr. Dee Dee Wang to [address] [place] [address] for evaluation. My notes for this consultation are attached. If you have questions, please do not hesitate to call me. I look forward to following your patient along with you.       Sincerely,    Marylen Blacksmith, MD

## 2022-05-05 NOTE — PROGRESS NOTES
Bello Hurtado presents today for   Chief Complaint   Patient presents with   [facility] Follow Up     from [facility] on 1/19-1/20/2022 for acute PE    Results     ECho 1/20/2022, CXR & CTA 1/19/2022, DVT (BLE) study 1/20/2022, COVDI (+) 1/10/2022, COVID (-) 1/19/2022       Is someone accompanying this pt? Yes. Sister    Is the patient using any DME equipment during [address]? No    -DME Company N/A    Depression Screening:  3 most recent PHQ Screens 5/5/2022   Little interest or pleasure in doing things Not at all   Feeling down, depressed, irritable, or hopeless Not at all   Total Score PHQ 2 0       Learning Assessment:  Learning Assessment 5/5/2022   PRIMARY LEARNER Patient   PRIMARY LANGUAGE ENGLISH   LEARNER PREFERENCE PRIMARY DEMONSTRATION   ANSWERED BY Patient   RELATIONSHIP SELF       Abuse Screening:  Abuse Screening Questionnaire 5/5/2022   Do you ever feel afraid of your partner? N   Are you in a relationship with someone who physically or mentally threatens you? N   Is it safe for you to go home? Y       Fall Risk  Fall Risk Assessment, last 12 mths 5/5/2022   Able to walk? Yes   Fall in past 12 months? 0   Do you feel unsteady? 0   Are you worried about falling 0         Coordination of Care:  1. Have you been to the ER, urgent care clinic since your last visit? Hospitalized since your last visit? Yes; Where: [facility], When: 1/19-1/20/2022-acute PE    2. Have you seen or consulted any other health care providers outside of the [address] [place] [address] since your last visit? Include any pap smears or colon screening. Yes. Dr. Lizeth Gregory, PCP, Dr. Nina Beyer, ophthalmologist, Dr. Raul Gallegos, cardiologist    COVID vaccine Lyndel Esters) received from [facility] on 3/9/2022 (1st dose) and 4/6/2022 (2nd dose) per vaccine card. Card copied and scanned into patient's chart. Immunization record updated.

## 2022-05-05 NOTE — PROGRESS NOTES
[facility]  Pulmonary, Critical Care, and Sleep Medicine    Pulmonary Office Hosp D/C F/U  Name: Jessica Burris 60 y.o. male  MRN: [id number]  : 1960  Service Date: 22    Referring Provider: Gertrude Lamb, [address],  [address] [address],[place] [address]  Chief Complaint:   Chief Complaint   Patient presents with   [facility] Follow Up     from [facility] on -2022 for acute PE    Results     ECho 2022, CXR & CTA 2022, DVT (BLE) study 2022, COVDI (+) 1/10/2022, COVID (-) 2022       History of Present Illness: (pt accompanied by his sister who provides some of hx)  Jessica Burris is a 60 y.o. male, who presents to Pulmonary clinic for hospital discharge followup for PE. Patient was admitted to [facility] from  through 2022, found to have COVID-19 infection. Patient underwent CTA chest and was found to have pulmonary embolism. Pt only took blood thinner for about a month. Denies any dyspnea on exertion, cough, sputum, etc.  Drinking at least 2 bottles of malt liquor daily  Reports he continues to smoke about 1PPD    Past Medical History:   Diagnosis Date    COVID-19 virus infection 1/10/2022    Tobacco abuse 2022     History reviewed. No pertinent surgical history.     Family History   Problem Relation Age of Onset    Diabetes Mother     Cancer Mother     Diabetes Maternal Aunt     Cancer Maternal Grandmother     Cancer Maternal Grandfather     Cancer Brother      Social History     Socioeconomic History    Marital status: UNKNOWN     Spouse name: Not on file    Number of children: Not on file    Years of education: Not on file    Highest education level: Not on file   Occupational History    Not on file   Tobacco Use    Smoking status: Current Every Day Smoker    Smokeless tobacco: Not on file   Substance and Sexual Activity    Alcohol use: Yes    Drug use: No    Sexual activity: Not on file   Other Topics Concern    Not on file   Social History Narrative    Not on file     Social Determinants of Health     Financial Resource Strain:     Difficulty of Paying Living Expenses: Not on file   Food Insecurity:     Worried About Running Out of Food in the Last Year: Not on file    Marleni of Food in the Last Year: Not on file   Transportation Needs:     Lack of Transportation (Medical): Not on file    Lack of Transportation (Non-Medical): Not on file   Physical Activity:     Days of Exercise per Week: Not on file    Minutes of Exercise per Session: Not on file   Stress:     Feeling of Stress : Not on file   Social Connections:     Frequency of Communication with Friends and Family: Not on file    Frequency of Social Gatherings with Friends and Family: Not on file    Attends Congregation Services: Not on file    Active Member of [address] [place] [address] or Organizations: Not on file    Attends Club or Organization Meetings: Not on file    Marital Status: Not on file   Intimate Partner Violence:     Fear of Current or Ex-Partner: Not on file    Emotionally Abused: Not on file    Physically Abused: Not on file    Sexually Abused: Not on file   Housing Stability:     Unable to Pay for Housing in the Last Year: Not on file    Number of [place] in the Last Year: Not on file    Unstable Housing in the Last Year: Not on file     No Known Allergies    Prior to Admission medications    Medication Sig Start Date End Date Taking? Authorizing Provider   diclofenac (VOLTAREN) 1 % gel Apply 4 g to affected area. 4/26/22  Yes Provider, Historical   multivitamin capsule Take 1 Capsule by mouth daily.  4/26/22  Yes Provider, Historical   ammonium lactate (LAC-HYDRIN) 12 % lotion APPLY TO THE AFFECTED AREA ONCE AS NEEDED (DRY SCALY SKIN) 1/24/22   Provider, Historical   atorvastatin (LIPITOR) 40 mg tablet TAKE 1 TABLET BY MOUTH EVERY DAY AT BEDTIME 1/24/22   Provider, Historical   meloxicam (MOBIC) 7.5 mg tablet 1 tablet as needed    Provider, Historical   oxyCODONE-acetaminophen (PERCOCET) 5-325 mg per tablet 1 tablet as needed    Provider, Historical   lidocaine 4 % patch Apply one patch a day for 12 hours as needed 1/20/22   Bernice Narayan DO   acetaminophen (TYLENOL) 500 mg tablet Take 1 Tablet by mouth every six (6) hours as needed for Pain. 1/20/22   Bernice Narayan DO   HYDROcodone-chlorpheniramine (TUSSIONEX) 10-8 mg/5 mL suspension Take 5 mL by mouth two (2) times a day. 1/10/22   Provider, Historical       Immunizations:  I have reviewed the patient's immunizations  Immunization History   Administered Date(s) Administered    COVID-19, Aileen Calixto, Primary or Immunocompromised Series, MRNA, PF, 100mcg/0.5mL 03/09/2021, 04/06/2021    Influenza Vaccine (Quad) PF (>6 Mo Flulaval, Fluarix, and >3 Yrs [address] [place] [address]) 01/20/2022    Tdap 06/07/2014       Review of Systems:  A complete review of systems was performed as stated in the HPI, all others are negative. Objective:    Physical Exam:  /75 (BP 1 Location: Left upper arm, BP Patient Position: Sitting, BP Cuff Size: Adult long)   Pulse 68   Temp 98.8 °F (37.1 °C) (Temporal)   Resp 16   Ht 5' 9\" (1.753 m)   Wt 63.2 kg (139 lb 6.4 oz)   SpO2 99%   BMI 20.59 kg/m²   Vitals were personally reviewed  Gen: no acute distress, pleasant and cooperative, sitting up in chair, ambulates without difficulty  HEENT: normocephalic/atraumatic, no ocular drainage, EOMI, no scleral icterus, nasal bridge midline, unable to assess nasal and oral cavities due to patient wearing mask in the setting of COVID-19 pandemic  Neck: supple, trachea midline, no JVD  CVS: regular rate rhythm, S1/S2, no murmurs/rubs/gallops  Lungs: good air entry B/L, CTABL, no wheezes/rales/rhonchi    Labs:   I have reviewed the patient's available labs  Lab Results   Component Value Date/Time    WBC 9.9 01/20/2022 06:11 AM    HGB 11.4 (L) 01/20/2022 06:11 AM    HCT 34.6 (L) 01/20/2022 06:11 AM    PLATELET 211 73/82/0438 06:11 AM    MCV 94.3 01/20/2022 06:11 AM     Lab Results   Component Value Date/Time    Sodium 133 (L) 01/20/2022 06:11 AM    Potassium 3.7 01/20/2022 06:11 AM    Chloride 99 (L) 01/20/2022 06:11 AM    CO2 27 01/20/2022 06:11 AM    Anion gap 7 01/20/2022 06:11 AM    Glucose 108 (H) 01/20/2022 06:11 AM    BUN 10 01/20/2022 06:11 AM    Creatinine 0.62 01/20/2022 06:11 AM    BUN/Creatinine ratio 16 01/20/2022 06:11 AM    GFR est AA >60 01/20/2022 06:11 AM    GFR est non-AA >60 01/20/2022 06:11 AM    Calcium 9.4 01/20/2022 06:11 AM    Bilirubin, total 0.6 01/19/2022 10:46 AM    Alk. phosphatase 69 01/19/2022 10:46 AM    Protein, total 7.3 01/19/2022 10:46 AM    Albumin 3.1 (L) 01/19/2022 10:46 AM    Globulin 4.2 (H) 01/19/2022 10:46 AM    A-G Ratio 0.7 (L) 01/19/2022 10:46 AM    ALT (SGPT) 13 (L) 01/19/2022 10:46 AM    AST (SGOT) 15 01/19/2022 10:46 AM       Imaging:  I have personally reviewed patient's imaging: No new imaging since hospitalization  CT Results (most recent):  Results from Hospital Encounter encounter on 01/19/22    CTA CHEST W OR W WO CONT    Narrative  EXAM:  CTA Chest with Contrast (Study for PE). CLINICAL INDICATION:  Shortness of breath and chest pain. COVID-19 infection. COMPARISON:  None. TECHNIQUE:  Helical volumetric sections of the chest are obtained with CT  pulmonary angiogram protocol following 80 mL Isovue-370. Subsequently, sagittal  and coronal multiplanar reconstruction images are obtained. Maximum intensity  projection images are generated to better delineate the pulmonary vasculature,  differentiate between the pulmonary arteries and veins and to increase  sensitivity to pulmonary emboli.   Radiation dose optimization techniques are  utilized as appropriate to the exam, with combination of automated exposure  control, adjustment of the mA and/or kV according to patient's size (Including  appropriate matching for site-specific examinations), or use of iterative  reconstruction technique. FINDINGS:    Pulmonary Arteries:    - Occlusive right lower lobe intersegmental artery PE between the anterior basal  segmental artery and the posterior segmental artery extending into the lateral  and posterior basal segmental arteries, associated with developing focal area of  groundglass opacities encompassing an area of about 9.0 x 6.0 cm. This is in  turn associated with minimal right pleural effusion.  - No significant reflux of contrast into the IVC. No interventricular septal  deviation or abnormal increase in the RV/LV ratio. Mild pulmonary artery  dilation, measuring up to about 3.2 cm. Lung, Pleura, Airways:    - Right lower lobe consolidative groundglass opacity as described, presumably  related to the ovary parenchymal ischemia from the occlusive PE.  - Mild right pleural effusion.  - Multiple patchy peripheral nodular groundglass opacities. - Left lower lobe atelectatic changes. Mediastinum:  No mediastinal adenopathy. Aorta:  No evidence of aortic dissection or aneurysm. Base of Neck:  No acute findings. Axillae:  Unremarkable. Upper Abdomen: The visualized contents of the abdomen are within normal limits. Skeletal Structures:  No acute findings. Impression  1. Occlusive right lower lobe intersegmental artery level PE extending into the  posterior and lateral basal segmental arteries resulting in a fairly large area  of ischemic changes as evidenced by developing ground glass opacities. 2.  Subtle peripheral nodular tiny groundglass densities or nodular opacities in  both lungs. Overall pattern is nonspecific, and may occur with a variety of  infectious or noninfectious processes. Classification indeterminant. 3.  Small right pleural effusion. Called E.D. at 14:30 and provided above findings to Dr. Bekah Bartlett.       PFTs: None on file    TTE:  I have reviewed the patient's TTE results  No results found for this or any previous visit. 01/19/22    ECHO ADULT COMPLETE 01/21/2022 1/21/2022    Interpretation Summary    Left Ventricle: Left ventricle is smaller than normal. Normal wall thickness. Normal wall motion. Normal left ventricular systolic function with a visually estimated EF of 55 - 60%. Normal diastolic function.   Right Ventricle: Right ventricle is mildly dilated.   Aorta: Mildly dilated sinus of Valsalva. Signed by: Marie Duval MD on 1/21/2022  9:31 AM        Assessment and Plan:  60 y.o. male with:    Impression:  1. Acute pulmonary embolism, provoked: Seen during hospitalization for COVID-19 infection on CTA chest from 1/19/2022. Patient has no history of recent prolonged immobilization or recent surgery. Patient had COVID for approximately 1 week prior to imaging. Patient was treated with anticoagulation, however patient took Xarelto for 1 month before discontinuing by self. Patient is asymptomatic  2. COVID-19 pneumonia: Patient had COVID-19 pneumonia in January, multifocal opacities seen on CT imaging from 1/19/2022, patient was symptomatic for 1 week prior. CT imaging showed multifocal infiltrates and supleural sparing with reticular changes B/L  3. Tobacco dependence: Patient is a 1 pack/day smoker  4. Alcohol dependence: Patient continues to drink approximately two 40 ounce bottles of malt liquor daily    Plan:  -Since patient was incompletely treated, patient discontinued anticoagulation after 1 month, we will repeat CTA of chest to rule out any residual chronic thromboembolism -- WO contrast also ordered to rule out ILD from COVID-19. Also repeat bilateral lower extremity duplex  -Full PFTs at next visit  -Advised to remain active  -Counseled on alcohol cessation.   Advised to follow-up with PCP regarding various cessation treatments including pharmacotherapy  -Immunizations reviewed, influenza and COVID vaccinations up-to-date  -Counseled patient regarding lifestyle precautions in COVID-19 pandemic including wearing mask in public and confined spaces, social/physical distancing, frequent hand hygiene, etc.  Advised pt to receive COVID-19 booster when possible  -I spent 3 minutes with patient regarding cessation of smoking cigarettes. I reviewed health risks of tobacco use including increased risk of MI, stroke, cancer, etc.  We reviewed various approaches to cessation including pills, patches, inhaler, gum, weaning self, \"cold turkey\", and smoking cessation classes. Pt declined cessation assistance at this time    Follow-up and Dispositions    · Return in about 3 months (around 8/5/2022).        Orders Placed This Encounter    AMB POC PFT COMPLETE W/BRONCHODILATOR    AMB POC PFT COMPLETE W/O BRONCHODILATOR    GAS DILUT/WASHOUT LUNG VOL W/WO DISTRIB VENT&VOL    DIFFUSING CAPACITY    CTA CHEST W OR W WO CONT    B/L LE duplex Marylen Blacksmith MD/MPH     Pulmonary, Critical Care Medicine  [facility]

## 2022-11-10 ENCOUNTER — OFFICE VISIT (OUTPATIENT)
Dept: PULMONOLOGY | Age: 62
End: 2022-11-10
Payer: MEDICAID

## 2022-11-10 VITALS — WEIGHT: 144 LBS | HEIGHT: 69 IN | BODY MASS INDEX: 21.33 KG/M2

## 2022-11-10 DIAGNOSIS — U07.1 COVID-19: ICD-10-CM

## 2022-11-10 DIAGNOSIS — Z72.0 TOBACCO ABUSE: ICD-10-CM

## 2022-11-10 DIAGNOSIS — I26.99 OTHER ACUTE PULMONARY EMBOLISM WITHOUT ACUTE COR PULMONALE (HCC): ICD-10-CM

## 2022-11-10 DIAGNOSIS — Z87.891 PERSONAL HISTORY OF TOBACCO USE, PRESENTING HAZARDS TO HEALTH: ICD-10-CM

## 2022-11-10 PROCEDURE — 94060 EVALUATION OF WHEEZING: CPT | Performed by: INTERNAL MEDICINE

## 2022-11-10 PROCEDURE — 94729 DIFFUSING CAPACITY: CPT | Performed by: INTERNAL MEDICINE

## 2022-11-10 PROCEDURE — 94727 GAS DIL/WSHOT DETER LNG VOL: CPT | Performed by: INTERNAL MEDICINE

## 2022-11-22 ENCOUNTER — HOSPITAL ENCOUNTER (OUTPATIENT)
Dept: CT IMAGING | Age: 62
Discharge: HOME | End: 2022-11-22
Attending: INTERNAL MEDICINE
Payer: MEDICAID

## 2022-11-22 DIAGNOSIS — Z72.0 TOBACCO ABUSE: ICD-10-CM

## 2022-11-22 DIAGNOSIS — U07.1 COVID-19: ICD-10-CM

## 2022-11-22 DIAGNOSIS — I26.99 OTHER ACUTE PULMONARY EMBOLISM WITHOUT ACUTE COR PULMONALE (HCC): ICD-10-CM

## 2022-11-22 DIAGNOSIS — Z87.891 PERSONAL HISTORY OF TOBACCO USE, PRESENTING HAZARDS TO HEALTH: ICD-10-CM

## 2022-11-22 PROBLEM — D53.1 MEGALOBLASTIC ANEMIA DUE TO VITAMIN B12 DEFICIENCY: Status: ACTIVE | Noted: 2022-11-22

## 2022-11-22 PROBLEM — D72.819 LEUKOPENIA: Status: ACTIVE | Noted: 2022-11-22

## 2022-11-22 LAB — CREAT UR-MCNC: 0.6 MG/DL (ref 0.6–1.3)

## 2022-11-22 PROCEDURE — 74011000636 HC RX REV CODE- 636: Performed by: INTERNAL MEDICINE

## 2022-11-22 PROCEDURE — 82565 ASSAY OF CREATININE: CPT

## 2022-11-22 PROCEDURE — 71275 CT ANGIOGRAPHY CHEST: CPT

## 2022-11-22 RX ORDER — HYDROCODONE BITARTRATE AND ACETAMINOPHEN 7.5; 325 MG/1; MG/1
1 TABLET ORAL
COMMUNITY

## 2022-11-22 RX ORDER — CYANOCOBALAMIN 1000 UG/ML
1000 INJECTION, SOLUTION INTRAMUSCULAR; SUBCUTANEOUS
COMMUNITY
Start: 2022-11-30

## 2022-11-22 RX ORDER — IODIXANOL 320 MG/ML
80 INJECTION, SOLUTION INTRAVASCULAR
Status: COMPLETED | OUTPATIENT
Start: 2022-11-22 | End: 2022-11-22

## 2022-11-22 RX ADMIN — IODIXANOL 80 ML: 320 INJECTION, SOLUTION INTRAVASCULAR at 10:31

## 2022-11-30 ENCOUNTER — OFFICE VISIT (OUTPATIENT)
Dept: PULMONOLOGY | Age: 62
End: 2022-11-30
Payer: MEDICAID

## 2022-11-30 VITALS
DIASTOLIC BLOOD PRESSURE: 71 MMHG | HEART RATE: 98 BPM | OXYGEN SATURATION: 98 % | RESPIRATION RATE: 16 BRPM | WEIGHT: 150.7 LBS | TEMPERATURE: 98.7 F | HEIGHT: 69 IN | SYSTOLIC BLOOD PRESSURE: 113 MMHG | BODY MASS INDEX: 22.32 KG/M2

## 2022-11-30 DIAGNOSIS — I26.99 OTHER ACUTE PULMONARY EMBOLISM WITHOUT ACUTE COR PULMONALE (HCC): Primary | ICD-10-CM

## 2022-11-30 DIAGNOSIS — F10.20 UNCOMPLICATED ALCOHOL DEPENDENCE (HCC): ICD-10-CM

## 2022-11-30 DIAGNOSIS — Z72.0 TOBACCO ABUSE: ICD-10-CM

## 2022-11-30 DIAGNOSIS — J44.9 COPD, GROUP B, BY GOLD 2017 CLASSIFICATION (HCC): ICD-10-CM

## 2022-11-30 DIAGNOSIS — R91.1 LUNG NODULE: ICD-10-CM

## 2022-11-30 DIAGNOSIS — Z87.891 PERSONAL HISTORY OF TOBACCO USE, PRESENTING HAZARDS TO HEALTH: ICD-10-CM

## 2022-11-30 PROCEDURE — 99407 BEHAV CHNG SMOKING > 10 MIN: CPT | Performed by: INTERNAL MEDICINE

## 2022-11-30 PROCEDURE — 99214 OFFICE O/P EST MOD 30 MIN: CPT | Performed by: INTERNAL MEDICINE

## 2022-11-30 RX ORDER — ALBUTEROL SULFATE 90 UG/1
1-2 AEROSOL, METERED RESPIRATORY (INHALATION)
Qty: 1 EACH | Refills: 5 | Status: SHIPPED | OUTPATIENT
Start: 2022-11-30

## 2022-11-30 RX ORDER — DM/P-EPHED/ACETAMINOPH/DOXYLAM 30-7.5/3
2 LIQUID (ML) ORAL AS NEEDED
Qty: 100 LOZENGE | Refills: 2 | Status: SHIPPED | OUTPATIENT
Start: 2022-11-30

## 2022-11-30 RX ORDER — TIOTROPIUM BROMIDE AND OLODATEROL 3.124; 2.736 UG/1; UG/1
2 SPRAY, METERED RESPIRATORY (INHALATION) DAILY
Qty: 3 EACH | Refills: 3 | Status: SHIPPED | OUTPATIENT
Start: 2022-11-30

## 2022-11-30 RX ORDER — NICOTINE 7MG/24HR
1 PATCH, TRANSDERMAL 24 HOURS TRANSDERMAL EVERY 24 HOURS
Qty: 30 PATCH | Refills: 0 | Status: SHIPPED | OUTPATIENT
Start: 2022-11-30 | End: 2022-12-30

## 2022-11-30 NOTE — LETTER
12/1/2022    Patient: Mary Becerra   YOB: 1960   Date of Visit: 11/30/2022     Asia Johnson MD  [address] [facility] [phone]  Via Fax: [phone]    Dear Asia Johnson MD,      Thank you for referring Mr. Lola Mohamud to [address] [place] [address] for evaluation. My notes for this consultation are attached. If you have questions, please do not hesitate to call me. I look forward to following your patient along with you.       Sincerely,    America Dutta MD

## 2022-11-30 NOTE — PROGRESS NOTES
Iglesia Galindo presents today for   Chief Complaint   Patient presents with    Nicotine Dependence     Follow up from 5/5/2022    Other     Pulmonary embolism, history of COVID-19    Results     PFT 11/10/2022, CTA chest 11/22/2022       Is someone accompanying this pt? Yes. Sister    Is the patient using any DME equipment during [address]? No    -DME Company N/A    Depression Screening:  3 most recent PHQ Screens 5/5/2022   Little interest or pleasure in doing things Not at all   Feeling down, depressed, irritable, or hopeless Not at all   Total Score PHQ 2 0       Learning Assessment:  Learning Assessment 5/5/2022   PRIMARY LEARNER Patient   PRIMARY LANGUAGE ENGLISH   LEARNER PREFERENCE PRIMARY DEMONSTRATION   ANSWERED BY Patient   RELATIONSHIP SELF       Abuse Screening:  Abuse Screening Questionnaire 5/5/2022   Do you ever feel afraid of your partner? N   Are you in a relationship with someone who physically or mentally threatens you? N   Is it safe for you to go home? Y       Fall Risk  Fall Risk Assessment, last 12 mths 5/5/2022   Able to walk? Yes   Fall in past 12 months? 0   Do you feel unsteady? 0   Are you worried about falling 0         Coordination of Care:  1. Have you been to the ER, urgent care clinic since your last visit? Hospitalized since your last visit? No    2. Have you seen or consulted any other health care providers outside of the [address] [place] [address] since your last visit? Include any pap smears or colon screening. Yes.  Dr. Piedad Guevara, PCP, Dr. Kayode Nath, ophthalmologist

## 2022-11-30 NOTE — PROGRESS NOTES
[address] [place] [address] [facility]  Pulmonary, Critical Care, and Sleep Medicine    Pulmonary Office  F/U  Name: Tuyet Gloria 60 y.o. male  MRN: [id number]  : 1960  Service Date: 22  Chief Complaint:   Chief Complaint   Patient presents with    Nicotine Dependence     Follow up from 2022    Other     Pulmonary embolism, history of COVID-19    Results     PFT 11/10/2022, CTA chest 2022       History of Present Illness: (pt accompanied by his sister who provides some of hx)  Tuyet Gloria is a 60 y.o. male, who presents to Pulmonary clinic for followup for PE. Patient was last seen in our clinic on 2022. Patient reports no new respiratory symptoms in the interval.  Denies any cough, sputum, RICH. Pt still smoking about 1/3PPD  No exacerbations in the interval.  Patient continues to report drinking, or drinks at least 4 beers daily. Past Medical History:   Diagnosis Date    COVID-19 virus infection 1/10/2022    Tobacco abuse 2022     History reviewed. No pertinent surgical history. Family History   Problem Relation Age of Onset    Diabetes Mother     Cancer Mother     Diabetes Maternal Aunt     Cancer Maternal Grandmother     Cancer Maternal Grandfather     Cancer Brother      Social History     Socioeconomic History    Marital status: SINGLE     Spouse name: Not on file    Number of children: Not on file    Years of education: Not on file    Highest education level: Not on file   Occupational History    Not on file   Tobacco Use    Smoking status: Some Days     Packs/day: 0.50     Years: 47.00     Pack years: 23.50     Types: Cigarettes    Smokeless tobacco: Never   Vaping Use    Vaping Use: Never used   Substance and Sexual Activity    Alcohol use:  Yes     Alcohol/week: 2.0 standard drinks     Types: 2 Cans of beer per week    Drug use: No    Sexual activity: Not on file   Other Topics Concern Not on file   Social History Narrative    Not on file     Social Determinants of Health     Financial Resource Strain: Not on file   Food Insecurity: Not on file   Transportation Needs: Not on file   Physical Activity: Not on file   Stress: Not on file   Social Connections: Not on file   Intimate Partner Violence: Not on file   Housing Stability: Not on file     No Known Allergies    Prior to Admission medications    Medication Sig Start Date End Date Taking? Authorizing Provider   cyanocobalamin (VITAMIN B12) 1,000 mcg/mL injection 1,000 mcg by SubCUTAneous route. 11/30/22  Yes Provider, Historical   rivaroxaban (XARELTO) 15 mg tab tablet Take 15 mg by mouth daily (with breakfast). Yes Provider, Historical   HYDROcodone-acetaminophen (NORCO) 7.5-325 mg per tablet Take 1 Tablet by mouth every eight (8) hours as needed for Pain. Yes Provider, Historical   diclofenac (VOLTAREN) 1 % gel Apply 4 g to affected area every six (6) hours. 4/26/22  Yes Provider, Historical   multivitamin capsule Take 1 Capsule by mouth daily. 4/26/22  Yes Provider, Historical   ammonium lactate (LAC-HYDRIN) 12 % lotion Apply  to affected area as needed. 1/24/22  Yes Provider, Historical   atorvastatin (LIPITOR) 40 mg tablet Take 40 mg by mouth nightly. 1/24/22  Yes Provider, Historical   meloxicam (MOBIC) 7.5 mg tablet Take 7.5 mg by mouth as needed for Pain. Yes Provider, Historical   lidocaine 4 % patch Apply one patch a day for 12 hours as needed  Patient taking differently: 1 Patch by TransDERmal route every twelve (12) hours. 1/20/22  Yes Luca Batista,    acetaminophen (TYLENOL) 500 mg tablet Take 1 Tablet by mouth every six (6) hours as needed for Pain. 1/20/22  Yes Luca Batista, DO   HYDROcodone-chlorpheniramine (TUSSIONEX) 10-8 mg/5 mL suspension Take 5 mL by mouth two (2) times a day.  1/10/22  Yes Provider, Historical       Immunizations:  I have reviewed the patient's immunizations  Immunization History   Administered Date(s) Administered    COVID-19, MODERNA BLUE border, Primary or Immunocompromised, (age 18y+), IM, 100 mcg/0.5mL 03/09/2021, 04/06/2021    Influenza Vaccine 09/30/2022    Influenza, FLUARIX, FLULAVAL, FLUZONE (age 10 mo+) AND AFLURIA, (age 1 y+), PF, 0.5mL 01/20/2022    Tdap 06/07/2014       Review of Systems:  A complete review of systems was performed as stated in the HPI, all others are negative. Objective:    Physical Exam:  /71 (BP 1 Location: Left upper arm, BP Patient Position: Sitting, BP Cuff Size: Adult)   Pulse 98   Temp 98.7 °F (37.1 °C) (Temporal)   Resp 16   Ht 5' 9\" (1.753 m)   Wt 68.4 kg (150 lb 11.2 oz)   SpO2 98%   BMI 22.25 kg/m²   Vitals were personally reviewed  Gen: no acute distress, pleasant and cooperative, sitting up in chair, ambulates without difficulty  HEENT: normocephalic/atraumatic, no ocular drainage, EOMI, no scleral icterus, nasal bridge midline, no nasal drainage, fair dentition, no oral lesions  Neck: supple, trachea midline, no JVD  CVS: regular rate rhythm, S1/S2, no murmurs/rubs/gallops  Lungs: good air entry B/L, CTABL, no wheezes/rales/rhonchi    Labs: I have reviewed the patient's available labs  Lab Results   Component Value Date/Time    WBC 9.9 01/20/2022 06:11 AM    HGB 11.4 (L) 01/20/2022 06:11 AM    HCT 34.6 (L) 01/20/2022 06:11 AM    PLATELET 586 24/55/7433 06:11 AM    MCV 94.3 01/20/2022 06:11 AM     Lab Results   Component Value Date/Time    Sodium 133 (L) 01/20/2022 06:11 AM    Potassium 3.7 01/20/2022 06:11 AM    Chloride 99 (L) 01/20/2022 06:11 AM    CO2 27 01/20/2022 06:11 AM    Anion gap 7 01/20/2022 06:11 AM    Glucose 108 (H) 01/20/2022 06:11 AM    BUN 10 01/20/2022 06:11 AM    Creatinine 0.62 01/20/2022 06:11 AM    BUN/Creatinine ratio 16 01/20/2022 06:11 AM    GFR est AA >60 01/20/2022 06:11 AM    GFR est non-AA >60 01/20/2022 06:11 AM    Calcium 9.4 01/20/2022 06:11 AM    Bilirubin, total 0.6 01/19/2022 10:46 AM    Alk.  phosphatase 69 01/19/2022 10:46 AM    Protein, total 7.3 01/19/2022 10:46 AM    Albumin 3.1 (L) 01/19/2022 10:46 AM    Globulin 4.2 (H) 01/19/2022 10:46 AM    A-G Ratio 0.7 (L) 01/19/2022 10:46 AM    ALT (SGPT) 13 (L) 01/19/2022 10:46 AM    AST (SGOT) 15 01/19/2022 10:46 AM       Imaging:  I have personally reviewed patient's imaging: CTA chest from 11/22/2022, patient has a right upper lobe inferior lateral groundglass opacity/nodule, otherwise infiltrates in right lower lobe have resolved. No masses, effusions, consolidation seen. Official report per radiology:  CT Results (most recent):  Results from Hospital Encounter encounter on 11/22/22    CTA CHEST W OR W WO CONT    Narrative  EXAM: CT Angiogram of the Chest    CLINICAL INDICATION:  CT PE protocol --- pt had RLL PE and was undertreated ---  please assess for further PE -- please use Visipaque    TECHNIQUE: CT angiogram of the chest performed. MIPS performed    All CT scans at this facility are performed using dose optimization technique as  appropriate to a performed exam, to include automated exposure control,  adjustment of the mA and/or kV according to patient size (including appropriate  matching for site specific examination) or use of iterative reconstruction  technique. IV CONTRAST: 100 cc of Isovue 370    COMPARISON: 1/19/2022    FINDINGS:  Limitations: Mild breathing motion is present. Thyroid: Unremarkable. Mediastinum: No evidence of adenopathy or fluid collection. Heart: Mild diffuse cardiac enlargement is noted. No evidence of right heart  strain. Pericardium: Unremarkable    Aorta: No evidence of aortic dissection or aneurysm. Pulmonary Arteries: The previously seen posterior segment right lower lobe  pulmonary embolus is near completely resolved. Small amount of chronic PE in the  posterior segment right lower lobe are noted. No acute identified. Trachea and Bronchi: Unremarkable. Pleura: No evidence of pleural effusion.     Lungs: In the lateral aspect of the anterior segment of the right upper lobe,  there is a 1.1 x 0.8 x 0.5 cm irregular nodule. This was an area of  consolidation on the prior CT. There is mild scarring in the posterior basal  segment of the right lower lobe. Axilla/Chest wall: Unremarkable. Upper Abdomen: No acute findings. Musculoskeletal: Mild dextrocurvature of the thoracic spine is noted. Impression  1. Irregular nodule in the lateral aspect of the anterior segment of the right  upper lobe. Recommend CT of the chest in 3 months to evaluate stability. May  consider PET/CT or soft tissue sampling. 2.  Small amount of chronic PE in the posterior basal aspect of the right lower  lobe which is significantly improved compared with prior CT. No evidence of  acute pulmonary embolus. PFTs: I have reviewed PFTs from 11/10/2022 personally as follows: Spirometry shows a moderate obstructive defect without BD response. Lung volumes normal.  Diffusion capacity is mildly reduced. TTE:  I have reviewed the patient's TTE results  No results found for this or any previous visit. 01/19/22    ECHO ADULT COMPLETE 01/21/2022 1/21/2022    Interpretation Summary    Left Ventricle: Left ventricle is smaller than normal. Normal wall thickness. Normal wall motion. Normal left ventricular systolic function with a visually estimated EF of 55 - 60%. Normal diastolic function. Right Ventricle: Right ventricle is mildly dilated. Aorta: Mildly dilated sinus of Valsalva. Signed by: Deidra Geller MD on 1/21/2022  9:31 AM        Assessment and Plan:  60 y.o. male with:    Impression:  1. Acute pulmonary embolism, provoked: Seen during hospitalization for COVID-19 infection on CTA chest from 1/19/2022. Patient has no history of recent prolonged immobilization or recent surgery. Patient had COVID for approximately 1 week prior to imaging.   Patient was treated with anticoagulation, however patient took Xarelto for 1 month before discontinuing by himself. Patient is asymptomatic. Repeat CTA chest from 11/10/2022 shows no recurrence  2. COPD, gold risk category B  3.  Lung nodule: Seen on CTA chest from 11/10/2022 and right upper lobe laterally and inferiorly, approximately 1 cm in size. Patient is a smoker which makes him high risk, however this nodule appears to be unclear infiltrate from COVID-19 infection earlier in the year. 4.  COVID-19 pneumonia: Patient had COVID-19 pneumonia in January, multifocal opacities seen on CT imaging from 1/19/2022, patient was symptomatic for 1 week prior. CT imaging showed multifocal infiltrates and supleural sparing with reticular changes B/L. Repeat CTA chest from 11/10/2022 shows resolution of infiltrates, 1 small nodule remains  5. Tobacco dependence: Patient cut back to one third PPD, prior 1 PPD smoker  6. Alcohol dependence: Patient continues to drink approximately 4 beers per day  ---> 40 ounce bottles of malt liquor daily    Plan:  -Given negative CTA chest, will hold off on any further anticoagulation. Advised patient that given previous history, he remains at slightly increased lifetime risk. Patient does not want to start prophylactic anticoagulation. Patient will remain off of anticoagulation going forward  -Repeat CT chest without contrast in 3 months to assess change of lung nodule and determine malignant potential  -Start Stiolto Respimat 2 puffs once daily. Sample given in clinic  -Start albuterol HFA 1-2puffs q4-6h PRN. Counseled patient that this is their rescue inhaler and to carry with them at all times.  -Counseled patient on proper inhaler technique  -Advised patient to remain active  -Counseled the pt to followup with PCP with regards to EtOH cessation  -Immunizations reviewed, influenza and COVID vaccinations up-to-date  -I spent 11 minutes with patient regarding cessation of smoking cigarettes.   I reviewed health risks of tobacco use including increased risk of MI, stroke, cancer, etc.  We reviewed various approaches to cessation including pills, patches, inhaler, gum, weaning self, \"cold turkey\", and smoking cessation classes. Pt was agreeable to cessation -- I prescribed nicotine patch taper as well as PRN nicotine replacement with nicotine lozenges q1h PRN      Follow-up and Dispositions    Return in about 4 months (around 3/30/2023).        Orders Placed This Encounter    CT CHEST WO CONT    tiotropium-olodateroL (STIOLTO RESPIMAT) 2.5-2.5 mcg/actuation inhaler    tiotropium-olodateroL (Stiolto Respimat) 2.5-2.5 mcg/actuation inhaler    albuterol (PROVENTIL HFA, VENTOLIN HFA, PROAIR HFA) 90 mcg/actuation inhaler    nicotine (NICODERM CQ) 7 mg/24 hr    nicotine buccal (POLACRILEX) 2 mg lozg lozenge       Sanam Siegel MD/MPH     Pulmonary, Critical Care Medicine  [facility]